# Patient Record
Sex: FEMALE | Race: BLACK OR AFRICAN AMERICAN | Employment: FULL TIME | URBAN - METROPOLITAN AREA
[De-identification: names, ages, dates, MRNs, and addresses within clinical notes are randomized per-mention and may not be internally consistent; named-entity substitution may affect disease eponyms.]

---

## 2017-01-31 ENCOUNTER — OFFICE VISIT (OUTPATIENT)
Dept: FAMILY MEDICINE CLINIC | Age: 47
End: 2017-01-31

## 2017-01-31 VITALS
SYSTOLIC BLOOD PRESSURE: 119 MMHG | DIASTOLIC BLOOD PRESSURE: 78 MMHG | RESPIRATION RATE: 16 BRPM | TEMPERATURE: 98.3 F | WEIGHT: 254 LBS | HEART RATE: 57 BPM | OXYGEN SATURATION: 100 % | BODY MASS INDEX: 40.82 KG/M2 | HEIGHT: 66 IN

## 2017-01-31 DIAGNOSIS — M25.512 ACUTE PAIN OF LEFT SHOULDER: Primary | ICD-10-CM

## 2017-01-31 DIAGNOSIS — R10.13 EPIGASTRIC ABDOMINAL PAIN: ICD-10-CM

## 2017-01-31 DIAGNOSIS — E55.9 VITAMIN D DEFICIENCY: ICD-10-CM

## 2017-01-31 DIAGNOSIS — K27.9 PUD (PEPTIC ULCER DISEASE): ICD-10-CM

## 2017-01-31 DIAGNOSIS — H53.8 BLURRY VISION: ICD-10-CM

## 2017-01-31 DIAGNOSIS — I20.0 UNSTABLE ANGINA PECTORIS (HCC): ICD-10-CM

## 2017-01-31 DIAGNOSIS — J00 ACUTE NASOPHARYNGITIS: ICD-10-CM

## 2017-01-31 DIAGNOSIS — M79.602 LEFT ARM PAIN: ICD-10-CM

## 2017-01-31 DIAGNOSIS — K81.1 CHRONIC CHOLECYSTITIS: ICD-10-CM

## 2017-01-31 DIAGNOSIS — E78.5 HYPERLIPIDEMIA WITH TARGET LDL LESS THAN 70: ICD-10-CM

## 2017-01-31 DIAGNOSIS — H66.003 ACUTE SUPPURATIVE OTITIS MEDIA OF BOTH EARS WITHOUT SPONTANEOUS RUPTURE OF TYMPANIC MEMBRANES, RECURRENCE NOT SPECIFIED: ICD-10-CM

## 2017-01-31 DIAGNOSIS — Z95.5 S/P CORONARY ARTERY STENT PLACEMENT: ICD-10-CM

## 2017-01-31 DIAGNOSIS — I10 ESSENTIAL HYPERTENSION: ICD-10-CM

## 2017-01-31 DIAGNOSIS — Z80.0 FAMILY HISTORY OF PANCREATIC CANCER: ICD-10-CM

## 2017-01-31 DIAGNOSIS — Z78.9 STATIN INTOLERANCE: ICD-10-CM

## 2017-01-31 RX ORDER — SIMETHICONE 180 MG
CAPSULE ORAL
COMMUNITY
End: 2021-01-12 | Stop reason: ALTCHOICE

## 2017-01-31 RX ORDER — SUCRALFATE 1 G/10ML
SUSPENSION ORAL AS NEEDED
COMMUNITY

## 2017-01-31 RX ORDER — PANTOPRAZOLE SODIUM 40 MG/1
40 TABLET, DELAYED RELEASE ORAL DAILY
COMMUNITY
End: 2018-04-03 | Stop reason: ALTCHOICE

## 2017-01-31 RX ORDER — RANITIDINE 150 MG/1
150 TABLET, FILM COATED ORAL 2 TIMES DAILY
COMMUNITY
End: 2020-05-15

## 2017-01-31 NOTE — PATIENT INSTRUCTIONS
Indigestion (Dyspepsia or Heartburn): Care Instructions  Your Care Instructions  Sometimes it can be hard to pinpoint the cause of indigestion (dyspepsia or heartburn). Most cases of an upset stomach with bloating, burning, burping, and nausea are minor and go away within several hours. Home treatment and over-the-counter medicine often are able to control symptoms. But if you take medicine to relieve your indigestion without making diet and lifestyle changes, your symptoms are likely to return again and again. If you get indigestion often, it may be a sign of a more serious medical problem. Be sure to follow up with your doctor, who may want to do tests to be sure of the cause of your indigestion. Follow-up care is a key part of your treatment and safety. Be sure to make and go to all appointments, and call your doctor if you are having problems. Its also a good idea to know your test results and keep a list of the medicines you take. How can you care for yourself at home? · Your doctor may recommend over-the-counter medicine. For mild or occasional indigestion, antacids such as Tums, Gaviscon, Mylanta, or Maalox may help. Your doctor also may recommend over-the-counter acid reducers, such as Pepcid AC, Tagamet HB, Zantac 75, or Prilosec. Read and follow all instructions on the label. If you use these medicines often, talk with your doctor. · Change your eating habits. ¨ Its best to eat several small meals instead of two or three large meals. ¨ After you eat, wait 2 to 3 hours before you lie down. ¨ Chocolate, mint, and alcohol can make GERD worse. ¨ Spicy foods, foods that have a lot of acid (like tomatoes and oranges), and coffee can make GERD symptoms worse in some people. If your symptoms are worse after you eat a certain food, you may want to stop eating that food to see if your symptoms get better. · Do not smoke or chew tobacco. Smoking can make GERD worse.  If you need help quitting, talk to your doctor about stop-smoking programs and medicines. These can increase your chances of quitting for good. · If you have GERD symptoms at night, raise the head of your bed 6 to 8 inches by putting the frame on blocks or placing a foam wedge under the head of your mattress. (Adding extra pillows does not work.)  · Do not wear tight clothing around your middle. · Lose weight if you need to. Losing just 5 to 10 pounds can help. · Do not take anti-inflammatory medicines, such as aspirin, ibuprofen (Advil, Motrin), or naproxen (Aleve). These can irritate the stomach. If you need a pain medicine, try acetaminophen (Tylenol), which does not cause stomach upset. When should you call for help? Call 911 anytime you think you may need emergency care. For example, call if:  · You passed out (lost consciousness). · You vomit blood or what looks like coffee grounds. · You pass maroon or very bloody stools. · You have chest pain or pressure. This may occur with:  ¨ Sweating. ¨ Shortness of breath. ¨ Nausea or vomiting. ¨ Pain that spreads from the chest to the neck, jaw, or one or both shoulders or arms. ¨ Feeling dizzy or lightheaded. ¨ A fast or uneven pulse. After calling 911, chew 1 adult-strength aspirin. Wait for an ambulance. Do not try to drive yourself. Call your doctor now or seek immediate medical care if:  · You have severe belly pain. · Your stools are black and tarlike or have streaks of blood. · You have trouble swallowing. · You are losing weight and do not know why. Watch closely for changes in your health, and be sure to contact your doctor if:  · You do not get better as expected. Where can you learn more? Go to http://daily-germania.info/. Enter K752 in the search box to learn more about \"Indigestion (Dyspepsia or Heartburn): Care Instructions. \"  Current as of: August 9, 2016  Content Version: 11.1  © 6170-4479 [employer], [employer].  Care instructions adapted under license by [address] (which disclaims liability or warranty for this information). If you have questions about a medical condition or this instruction, always ask your healthcare professional. [address] any warranty or liability for your use of this information. HEARTBURN occurs when stomach acid moves back up through your esophagus. Several conditions and foods can contribute to this process. Common causes include:    Hiatal Hernia  Pregnancy  Alcohol use  Overweight or Obesity  Smoking. Consuming certain types of foods or drinks can increase the acid content of the stomach and cause heartburn. AVOID THESE TRIGGERS:     Stress  Alcoholic or carbonated beverages  Caffeine (coffee, tea, chocolate, soda)  Acidic foods or drinks (Oranges or orange juice, apples, apple juice, grapefruit or grapefruit juice, bananas, grape juice)  Tomatoes or tomato based foods (pizza, spaghetti, chili)  Greasy, Fatty or Fried foods  Spicy Foods (including hot sauce)  Garlic  Onions  Mint flavoring (peppermint, speramint, cinnamon). PREVENTIVE MEASURES    Do not wear tight, restrictive clothing. Lose weight. Elevate the head of the bed 4 to 6 inches with blocks or a wedge pillow. Wait 2 to 3 hours after a meal before lying down. Avoid the triggers. MEDICATIONS     Try over the counter medications if needed first.  These include:  TUMS, ROLAIDS, Mylanta, Prilosec 20 mg, Pepcid 20 mg, Tagamet, Zantac up to 150 mg twice daily or 300 mg daily, Nexium 20 mg up to 40 mg daily and Prevacid up to 30 mg daily. Ideally, take for 2 weeks after symptoms consistently appear. NOTIFY OUR OFFICE    If symptoms worsen or persist.   If breathing or swallowing becomes difficult. If you regurgitate blood. DIAL 911 IF THE HEART BURN SYMPTOMS ARE ACCOMPANIED BY   Shortness of breath, sweating, pain in the jaw, neck or arm, cold clammy feeling with nausea or vomiting.   This could be a HEART ATTACK. Indigestion (Dyspepsia or Heartburn): Care Instructions  Your Care Instructions  Sometimes it can be hard to pinpoint the cause of indigestion (dyspepsia or heartburn). Most cases of an upset stomach with bloating, burning, burping, and nausea are minor and go away within several hours. Home treatment and over-the-counter medicine often are able to control symptoms. But if you take medicine to relieve your indigestion without making diet and lifestyle changes, your symptoms are likely to return again and again. If you get indigestion often, it may be a sign of a more serious medical problem. Be sure to follow up with your doctor, who may want to do tests to be sure of the cause of your indigestion. Follow-up care is a key part of your treatment and safety. Be sure to make and go to all appointments, and call your doctor if you are having problems. Its also a good idea to know your test results and keep a list of the medicines you take. How can you care for yourself at home? · Your doctor may recommend over-the-counter medicine. For mild or occasional indigestion, antacids such as Tums, Gaviscon, Mylanta, or Maalox may help. Your doctor also may recommend over-the-counter acid reducers, such as Pepcid AC, Tagamet HB, Zantac 75, or Prilosec. Read and follow all instructions on the label. If you use these medicines often, talk with your doctor. · Change your eating habits. ¨ Its best to eat several small meals instead of two or three large meals. ¨ After you eat, wait 2 to 3 hours before you lie down. ¨ Chocolate, mint, and alcohol can make GERD worse. ¨ Spicy foods, foods that have a lot of acid (like tomatoes and oranges), and coffee can make GERD symptoms worse in some people. If your symptoms are worse after you eat a certain food, you may want to stop eating that food to see if your symptoms get better. · Do not smoke or chew tobacco. Smoking can make GERD worse.  If you need help quitting, talk to your doctor about stop-smoking programs and medicines. These can increase your chances of quitting for good. · If you have GERD symptoms at night, raise the head of your bed 6 to 8 inches by putting the frame on blocks or placing a foam wedge under the head of your mattress. (Adding extra pillows does not work.)  · Do not wear tight clothing around your middle. · Lose weight if you need to. Losing just 5 to 10 pounds can help. · Do not take anti-inflammatory medicines, such as aspirin, ibuprofen (Advil, Motrin), or naproxen (Aleve). These can irritate the stomach. If you need a pain medicine, try acetaminophen (Tylenol), which does not cause stomach upset. When should you call for help? Call 911 anytime you think you may need emergency care. For example, call if:  · You passed out (lost consciousness). · You vomit blood or what looks like coffee grounds. · You pass maroon or very bloody stools. · You have chest pain or pressure. This may occur with:  ¨ Sweating. ¨ Shortness of breath. ¨ Nausea or vomiting. ¨ Pain that spreads from the chest to the neck, jaw, or one or both shoulders or arms. ¨ Feeling dizzy or lightheaded. ¨ A fast or uneven pulse. After calling 911, chew 1 adult-strength aspirin. Wait for an ambulance. Do not try to drive yourself. Call your doctor now or seek immediate medical care if:  · You have severe belly pain. · Your stools are black and tarlike or have streaks of blood. · You have trouble swallowing. · You are losing weight and do not know why. Watch closely for changes in your health, and be sure to contact your doctor if:  · You do not get better as expected. Where can you learn more? Go to http://daily-germania.info/. Enter F023 in the search box to learn more about \"Indigestion (Dyspepsia or Heartburn): Care Instructions. \"  Current as of: August 9, 2016  Content Version: 11.1  © 0410-9866 [employer], [facility].  Care instructions adapted under license by [employer] (which disclaims liability or warranty for this information). If you have questions about a medical condition or this instruction, always ask your healthcare professional. [address] any warranty or liability for your use of this information. Learning About Obesity  What is obesity? Obesity means having so much body fat that your health is in danger. Having too much body fat can lead to type 2 diabetes, heart disease, high blood pressure, arthritis, sleep apnea, and stroke. Even if you don't feel bad now, think about these health risks. Do they seem like a good reason to start on a new path toward a healthier weight? Or do you have another personal, powerful reason for wanting to lose weight? Whatever it is, keep it in mind. It can be hard to change eating habits and exercise habits. But with your own reason and plan, you can do it. How do you know if your weight is in the obesity range? To know if your weight is in the obesity range, your doctor looks at your body mass index (BMI) and waist size. Your BMI is a number that is calculated from your weight and your height. To figure your BMI for yourself, get a BMI table from your doctor or use an online tool, such as http://www.weldon.com/ on the [employer] of [employer]. What causes obesity? When you take in more calories than you burn off, you gain weight. How you eat, how active you are, and other things affect how your body uses calories and whether you gain weight. If you have family members who have too much body fat, you may have inherited a tendency to gain weight. And your family also helps form your eating and lifestyle habits, which can lead to obesity. Also, our busy lives make it harder to plan and cook healthy meals.  For many of us, it's easier to reach for prepared foods, go out to eat, or go to the drive-through. But these foods are often high in saturated fat and calories. Portions are often too large. What can you do to reach a healthy weight? Focus on health, not diets. Diets are hard to stay on and don't work in the long run. It is very hard to stay with a diet that includes lots of big changes in your eating habits. Instead of a diet, focus on lifestyle changes that will improve your health and achieve the right balance of energy and calories. To lose weight, you need to burn more calories than you take in. You can do it by eating healthy foods in reasonable amounts and becoming more active, even a little bit every day. Making small changes over time can add up to a lot. Make a plan for change. Many people have found that naming their reasons for change and staying focused on their plan can make a big difference. Work with your doctor to create a plan that is right for you. · Ask yourself: Pierrelazaro Cantrell are my personal, most powerful reasons for wanting this change? What will my life look like when I've made the change? \"  · Set your long-term goal. Make it specific, such as \"I will lose x pounds. \"  · Break your long-term goal into smaller, short-term goals. Make these small steps specific and within your reach, things you know you can do. These steps are what keep you going from day to day. How can you stay on your plan for change? Be ready. Choose to start during a time when there are few events that might trigger slip-ups, like holidays, social events, and high-stress periods. Decide on your first few steps. Most people have more success when they make small changes, one step at a time. For example, you might switch a daily candy bar to a piece of fruit, walk 10 minutes more, or add more vegetables to a meal.  Line up your support people. Make sure you're not going to be alone as you make this change. Connect with people who understand how important it is to you.  Ask family members and friends for help in keeping with your plan. And think about who could make it harder for you, and how to handle them. Try tracking. People who keep track of what they eat, feel, and do are better at losing weight. Try writing down things like:  · What and how much you eat. · How you feel before and after each meal.  · Details about each meal (like eating out or at home, eating alone, or with friends or family). · What you do to be active. Look and plan. As you track, look for patterns that you may want to change. Take note of:  · When you eat and whether you skip meals. · How often you eat out. · How many fruits and vegetables you eat. · When you eat beyond feeling full. · When and why you eat for reasons other than being hungry. When you stray from your plan, don't get upset. Figure out what made you slip up and how you can fix it. Can you take medicines or have surgery to lose weight? Before your doctor will prescribe medicines or surgery, he or she will probably want you to be more active and follow your healthy eating plan for a period of time. These habits are key lifelong changes for managing your weight, with or without other medical treatment. And these changes can help you avoid weight-related health problems. Follow-up care is a key part of your treatment and safety. Be sure to make and go to all appointments, and call your doctor if you are having problems. It's also a good idea to know your test results and keep a list of the medicines you take. Where can you learn more? Go to http://daily-germania.info/. Enter N111 in the search box to learn more about \"Learning About Obesity. \"  Current as of: February 16, 2016  Content Version: 11.1  © 1922-2311 [employer], [employer]. Care instructions adapted under license by [employer] (which disclaims liability or warranty for this information).  If you have questions about a medical condition or this instruction, always ask your healthcare professional. [place] [address] any warranty or liability for your use of this information. Starting a Weight Loss Plan: Care Instructions  Your Care Instructions  If you are thinking about losing weight, it can be hard to know where to start. Your doctor can help you set up a weight loss plan that best meets your needs. You may want to take a class on nutrition or exercise, or join a weight loss support group. If you have questions about how to make changes to your eating or exercise habits, ask your doctor about seeing a registered dietitian or an exercise specialist.  It can be a big challenge to lose weight. But you do not have to make huge changes at once. Make small changes, and stick with them. When those changes become habit, add a few more changes. If you do not think you are ready to make changes right now, try to pick a date in the future. Make an appointment to see your doctor to discuss whether the time is right for you to start a plan. Follow-up care is a key part of your treatment and safety. Be sure to make and go to all appointments, and call your doctor if you are having problems. Its also a good idea to know your test results and keep a list of the medicines you take. How can you care for yourself at home? · Set realistic goals. Many people expect to lose much more weight than is likely. A weight loss of 5% to 10% of your body weight may be enough to improve your health. · Get family and friends involved to provide support. Talk to them about why you are trying to lose weight, and ask them to help. They can help by participating in exercise and having meals with you, even if they may be eating something different. · Find what works best for you. If you do not have time or do not like to cook, a program that offers meal replacement bars or shakes may be better for you.  Or if you like to prepare meals, finding a plan that includes daily menus and recipes may be best.  · Ask your doctor about other health professionals who can help you achieve your weight loss goals. ¨ A dietitian can help you make healthy changes in your diet. ¨ An exercise specialist or  can help you develop a safe and effective exercise program.  ¨ A counselor or psychiatrist can help you cope with issues such as depression, anxiety, or family problems that can make it hard to focus on weight loss. · Consider joining a support group for people who are trying to lose weight. Your doctor can suggest groups in your area. Where can you learn more? Go to http://dailyIntraxiogermania.info/. Enter H630 in the search box to learn more about \"Starting a Weight Loss Plan: Care Instructions. \"  Current as of: February 16, 2016  Content Version: 11.1  © 4883-7909 [employer]. Care instructions adapted under license by [employer] (which disclaims liability or warranty for this information). If you have questions about a medical condition or this instruction, always ask your healthcare professional. [place] [address] any warranty or liability for your use of this information. EXERCISE 150 MINUTES WEEKLY. THIS CAN BE ACHIEVED BY WORKING OUT OR WALKING A MINIMUM OF 30 MINUTES FOR 5 DAYS WEEKLY. YOU CAN EXERCISE IN INCREMENTS OF 10-15 MINUTES UP TO 3 TIMES A DAY. Consider performing \"brainless exercise. \"  Choose your favorite tv program.  Five minutes before and for 5 minutes after the tv program, stretch your body. While the program is on, walk in place watching the show. When commercials come on, rest or walk around the house to do other things. When the program begins, return to walking in place. When you are able keep walking during the commercials and add light weights to your ankles or hands. By the end of the show, you would have walked 30 minutes.   If you need shorter spurts of exercise, walk during the commercials and rest during the show. Drink to glasses of water prior to any exercise to prevent dehydration and to improve the results of the work out. Your RESTING HEART RATE is the number of times your heart beats per minute when you are not exerting yourself. The more fit you are, the lower your resting heart rate will be. Your MAXIMUM HEART RATE is the number of times per minute your heart pumps when it is working at 100% capacity. NEVER EXERCISE AT YOUR MAXIMUM HEART RATE. MAX HEART RATE = 220 - your age    For example, in a 48year old, the maximum heart rate is 220-50 = 170 beats per minute    Your [place] is the number of beats per minute our heart should pump during aerobic exercise. Reaching your target heart rate indicates that your body is receiving maximum cardiovascular and fat burning benefits. If you are fit, your TARGET HEART RATE = 70-85% of your maximum Heart rate      For a 48year old with vigorous intensity physical activity,         Target heart rate= 170 bpm x .70 = 119 bpm     Target heart rate = 170 bpm x .85=  145 bpm        Therefore, your target heart rate during physical activity is 119-145      If you are not fit, TARGET HEART RATE = 50-70% of your maximum Heart rate    MODERATE CONSISTENT AEROBIC EXERCISE OR WALKING FOR AT LEAST 150 MINUTES WEEKLY IS AN ESSENTIAL PART OF ANY WEIGHT LOSS OF WEIGHT MAINTENANCE PROGRAM.     During WEIGHT LOSS PHASE, at least 75% of your exercise needs to be walking or moderate aerobic activity and 25% or 0% can be Isometric or Resistance type exercises (the latter can be deferred to the weight maintenance phase.)     During WEIGHT MAINTENANCE PHASE, at least 50% of your exercise needs to be aerobic and 50% Isometric or Resistance type exercises. BENEFITS OF MODERATE INTENSITY EXERCISE MOST DAYS OF THE WEEK:     1. Insulin Resistance improves 30-85%   2. Abdominal Fat decreases by 30%   3.   Inflammatory Markers decrease by 30% (therefore pain decreases)   4. Systolic and Diastolic Blood Pressure decrease by 4 mmHg   5. HDL improves by 5%   6. Triglycerides decrease by 15%   7. Shift from small dense LDL to large dense LDL (therefore decrease Insulin Resistance)   8. Decrease coagulability                  Starting a Weight Loss Plan: Care Instructions  Your Care Instructions  If you are thinking about losing weight, it can be hard to know where to start. Your doctor can help you set up a weight loss plan that best meets your needs. You may want to take a class on nutrition or exercise, or join a weight loss support group. If you have questions about how to make changes to your eating or exercise habits, ask your doctor about seeing a registered dietitian or an exercise specialist.  It can be a big challenge to lose weight. But you do not have to make huge changes at once. Make small changes, and stick with them. When those changes become habit, add a few more changes. If you do not think you are ready to make changes right now, try to pick a date in the future. Make an appointment to see your doctor to discuss whether the time is right for you to start a plan. Follow-up care is a key part of your treatment and safety. Be sure to make and go to all appointments, and call your doctor if you are having problems. Its also a good idea to know your test results and keep a list of the medicines you take. How can you care for yourself at home? · Set realistic goals. Many people expect to lose much more weight than is likely. A weight loss of 5% to 10% of your body weight may be enough to improve your health. · Get family and friends involved to provide support. Talk to them about why you are trying to lose weight, and ask them to help. They can help by participating in exercise and having meals with you, even if they may be eating something different. · Find what works best for you.  If you do not have time or do not like to cook, a program that offers meal replacement bars or shakes may be better for you. Or if you like to prepare meals, finding a plan that includes daily menus and recipes may be best.  · Ask your doctor about other health professionals who can help you achieve your weight loss goals. ¨ A dietitian can help you make healthy changes in your diet. ¨ An exercise specialist or  can help you develop a safe and effective exercise program.  ¨ A counselor or psychiatrist can help you cope with issues such as depression, anxiety, or family problems that can make it hard to focus on weight loss. · Consider joining a support group for people who are trying to lose weight. Your doctor can suggest groups in your area. Where can you learn more? Go to http://daily-germania.info/. Enter Q005 in the search box to learn more about \"Starting a Weight Loss Plan: Care Instructions. \"  Current as of: February 16, 2016  Content Version: 11.1  © 1901-6601 [employer], [employer]. Care instructions adapted under license by [employer] (which disclaims liability or warranty for this information). If you have questions about a medical condition or this instruction, always ask your healthcare professional. [place] [id number] any warranty or liability for your use of this [email or web address]

## 2017-01-31 NOTE — PROGRESS NOTES
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 · Do not smoke or chew tobacco. Smoking can make GERD worse. If you need help quitting, talk to your doctor about stop-smoking programs and medicines. These can increase your chances of quitting for good. · If you have GERD symptoms at night, raise the head of your bed 6 to 8 inches by putting the frame on blocks or placing a foam wedge under the head of your mattress. (Adding extra pillows does not work.)  · Do not wear tight clothing around your middle. · Lose weight if you need to. Losing just 5 to 10 pounds can help. · Do not take anti-inflammatory medicines, such as aspirin, ibuprofen (Advil, Motrin), or naproxen (Aleve). These can irritate the stomach. If you need a pain medicine, try acetaminophen (Tylenol), which does not cause stomach upset. When should you call for help? Call 911 anytime you think you may need emergency care. For example, call if:  · You passed out (lost consciousness). · You vomit blood or what looks like coffee grounds. · You pass maroon or very bloody stools.   · You have chest pain or pressure. This may occur with:  ¨ Sweating. ¨ Shortness of breath. ¨ Nausea or vomiting. ¨ Pain that spreads from the chest to the neck, jaw, or one or both shoulders or arms. ¨ Feeling dizzy or lightheaded. ¨ A fast or uneven pulse. After calling 911, chew 1 adult-strength aspirin. Wait for an ambulance. Do not try to drive yourself. Call your doctor now or seek immediate medical care if:  · You have severe belly pain. · Your stools are black and tarlike or have streaks of blood. · You have trouble swallowing. · You are losing weight and do not know why. Watch closely for changes in your health, and be sure to contact your doctor if:  · You do not get better as expected. Where can you learn more? Go to http://daliy-germania.info/. Enter N971 in the search box to learn more about \"Indigestion (Dyspepsia or Heartburn): Care Instructions. \"  Current as of: August 9, 2016  Content Version: 11.1  © 8506-7398 [employer]. Care instructions adapted under license by [employer] (which disclaims liability or warranty for this information). If you have questions about a medical condition or this instruction, always ask your healthcare professional. [address] any warranty or liability for your use of this information. Learning About Obesity  What is obesity? Obesity means having so much body fat that your health is in danger. Having too much body fat can lead to type 2 diabetes, heart disease, high blood pressure, arthritis, sleep apnea, and stroke. Even if you don't feel bad now, think about these health risks. Do they seem like a good reason to start on a new path toward a healthier weight? Or do you have another personal, powerful reason for wanting to lose weight? Whatever it is, keep it in mind. It can be hard to change eating habits and exercise habits. But with your own reason and plan, you can do it.   How do you know if your weight is in the obesity range? To know if your weight is in the obesity range, your doctor looks at your body mass index (BMI) and waist size. Your BMI is a number that is calculated from your weight and your height. To figure your BMI for yourself, get a BMI table from your doctor or use an online tool, such as http://www.AgentPair.com/ on the [employer] of [employer]. What causes obesity? When you take in more calories than you burn off, you gain weight. How you eat, how active you are, and other things affect how your body uses calories and whether you gain weight. If you have family members who have too much body fat, you may have inherited a tendency to gain weight. And your family also helps form your eating and lifestyle habits, which can lead to obesity. Also, our busy lives make it harder to plan and cook healthy meals. For many of us, it's easier to reach for prepared foods, go out to eat, or go to the drive-through. But these foods are often high in saturated fat and calories. Portions are often too large. What can you do to reach a healthy weight? Focus on health, not diets. Diets are hard to stay on and don't work in the long run. It is very hard to stay with a diet that includes lots of big changes in your eating habits. Instead of a diet, focus on lifestyle changes that will improve your health and achieve the right balance of energy and calories. To lose weight, you need to burn more calories than you take in. You can do it by eating healthy foods in reasonable amounts and becoming more active, even a little bit every day. Making small changes over time can add up to a lot. Make a plan for change. Many people have found that naming their reasons for change and staying focused on their plan can make a big difference. Work with your doctor to create a plan that is right for you.   · Ask yourself: Jazz Sanders are my personal, most powerful reasons for wanting this change? What will my life look like when I've made the change? \"  · Set your long-term goal. Make it specific, such as \"I will lose x pounds. \"  · Break your long-term goal into smaller, short-term goals. Make these small steps specific and within your reach, things you know you can do. These steps are what keep you going from day to day. How can you stay on your plan for change? Be ready. Choose to start during a time when there are few events that might trigger slip-ups, like holidays, social events, and high-stress periods. Decide on your first few steps. Most people have more success when they make small changes, one step at a time. For example, you might switch a daily candy bar to a piece of fruit, walk 10 minutes more, or add more vegetables to a meal.  Line up your support people. Make sure you're not going to be alone as you make this change. Connect with people who understand how important it is to you. Ask family members and friends for help in keeping with your plan. And think about who could make it harder for you, and how to handle them. Try tracking. People who keep track of what they eat, feel, and do are better at losing weight. Try writing down things like:  · What and how much you eat. · How you feel before and after each meal.  · Details about each meal (like eating out or at home, eating alone, or with friends or family). · What you do to be active. Look and plan. As you track, look for patterns that you may want to change. Take note of:  · When you eat and whether you skip meals. · How often you eat out. · How many fruits and vegetables you eat. · When you eat beyond feeling full. · When and why you eat for reasons other than being hungry. When you stray from your plan, don't get upset. Figure out what made you slip up and how you can fix it. Can you take medicines or have surgery to lose weight?   Before your doctor will prescribe medicines or surgery, he or she will probably want you to be more active and follow your healthy eating plan for a period of time. These habits are key lifelong changes for managing your weight, with or without other medical treatment. And these changes can help you avoid weight-related health problems. Follow-up care is a key part of your treatment and safety. Be sure to make and go to all appointments, and call your doctor if you are having problems. It's also a good idea to know your test results and keep a list of the medicines you take. Where can you learn more? Go to http://daily-germania.info/. Enter N111 in the search box to learn more about \"Learning About Obesity. \"  Current as of: February 16, 2016  Content Version: 11.1  © 4755-1376 [employer]. Care instructions adapted under license by [employer] (which disclaims liability or warranty for this information). If you have questions about a medical condition or this instruction, always ask your healthcare professional. [place] [address] any warranty or liability for your use of this information. Starting a Weight Loss Plan: Care Instructions  Your Care Instructions  If you are thinking about losing weight, it can be hard to know where to start. Your doctor can help you set up a weight loss plan that best meets your needs. You may want to take a class on nutrition or exercise, or join a weight loss support group. If you have questions about how to make changes to your eating or exercise habits, ask your doctor about seeing a registered dietitian or an exercise specialist.  It can be a big challenge to lose weight. But you do not have to make huge changes at once. Make small changes, and stick with them. When those changes become habit, add a few more changes. If you do not think you are ready to make changes right now, try to pick a date in the future.  Make an appointment to see your doctor to discuss whether the time is right for you to start a plan. Follow-up care is a key part of your treatment and safety. Be sure to make and go to all appointments, and call your doctor if you are having problems. Its also a good idea to know your test results and keep a list of the medicines you take. How can you care for yourself at home? · Set realistic goals. Many people expect to lose much more weight than is likely. A weight loss of 5% to 10% of your body weight may be enough to improve your health. · Get family and friends involved to provide support. Talk to them about why you are trying to lose weight, and ask them to help. They can help by participating in exercise and having meals with you, even if they may be eating something different. · Find what works best for you. If you do not have time or do not like to cook, a program that offers meal replacement bars or shakes may be better for you. Or if you like to prepare meals, finding a plan that includes daily menus and recipes may be best.  · Ask your doctor about other health professionals who can help you achieve your weight loss goals. ¨ A dietitian can help you make healthy changes in your diet. ¨ An exercise specialist or  can help you develop a safe and effective exercise program.  ¨ A counselor or psychiatrist can help you cope with issues such as depression, anxiety, or family problems that can make it hard to focus on weight loss. · Consider joining a support group for people who are trying to lose weight. Your doctor can suggest groups in your area. Where can you learn more? Go to http://daily-germania.info/. Enter C787 in the search box to learn more about \"Starting a Weight Loss Plan: Care Instructions. \"  Current as of: February 16, 2016  Content Version: 11.1  © 9068-6946 [employer].  Care instructions adapted under license by [employer] (which disclaims liability or warranty for this information). If you have questions about a medical condition or this instruction, always ask your healthcare professional. [address] any warranty or liability for your use of this information. EXERCISE 150 MINUTES WEEKLY. THIS CAN BE ACHIEVED BY WORKING OUT OR WALKING A MINIMUM OF 30 MINUTES FOR 5 DAYS WEEKLY. YOU CAN EXERCISE IN INCREMENTS OF 10-15 MINUTES UP TO 3 TIMES A DAY. Consider performing \"brainless exercise. \"  Choose your favorite tv program.  Five minutes before and for 5 minutes after the tv program, stretch your body. While the program is on, walk in place watching the show. When commercials come on, rest or walk around the house to do other things. When the program begins, return to walking in place. When you are able keep walking during the commercials and add light weights to your ankles or hands. By the end of the show, you would have walked 30 minutes. If you need shorter spurts of exercise, walk during the commercials and rest during the show. Drink to glasses of water prior to any exercise to prevent dehydration and to improve the results of the work out. Your RESTING HEART RATE is the number of times your heart beats per minute when you are not exerting yourself. The more fit you are, the lower your resting heart rate will be. Your MAXIMUM HEART RATE is the number of times per minute your heart pumps when it is working at 100% capacity. NEVER EXERCISE AT YOUR MAXIMUM HEART RATE. MAX HEART RATE = 220 - your age    For example, in a 48year old, the maximum heart rate is 220-50 = 170 beats per minute    Your [place] is the number of beats per minute our heart should pump during aerobic exercise. Reaching your target heart rate indicates that your body is receiving maximum cardiovascular and fat burning benefits.      If you are fit, your TARGET HEART RATE = 70-85% of your maximum Heart rate      For a 48year old with vigorous intensity physical activity,         Target heart rate= 170 bpm x .70 = 119 bpm     Target heart rate = 170 bpm x .85=  145 bpm        Therefore, your target heart rate during physical activity is 119-145      If you are not fit, TARGET HEART RATE = 50-70% of your maximum Heart rate    MODERATE CONSISTENT AEROBIC EXERCISE OR WALKING FOR AT LEAST 150 MINUTES WEEKLY IS AN ESSENTIAL PART OF ANY WEIGHT LOSS OF WEIGHT MAINTENANCE PROGRAM.     During WEIGHT LOSS PHASE, at least 75% of your exercise needs to be walking or moderate aerobic activity and 25% or 0% can be Isometric or Resistance type exercises (the latter can be deferred to the weight maintenance phase.)     During WEIGHT MAINTENANCE PHASE, at least 50% of your exercise needs to be aerobic and 50% Isometric or Resistance type exercises. BENEFITS OF MODERATE INTENSITY EXERCISE MOST DAYS OF THE WEEK:     1. Insulin Resistance improves 30-85%   2. Abdominal Fat decreases by 30%   3. Inflammatory Markers decrease by 30% (therefore pain decreases)   4. Systolic and Diastolic Blood Pressure decrease by 4 mmHg   5. HDL improves by 5%   6. Triglycerides decrease by 15%   7. Shift from small dense LDL to large dense LDL (therefore decrease Insulin Resistance)   8. Decrease coagulability                  Starting a Weight Loss Plan: Care Instructions  Your Care Instructions  If you are thinking about losing weight, it can be hard to know where to start. Your doctor can help you set up a weight loss plan that best meets your needs. You may want to take a class on nutrition or exercise, or join a weight loss support group. If you have questions about how to make changes to your eating or exercise habits, ask your doctor about seeing a registered dietitian or an exercise specialist.  It can be a big challenge to lose weight. But you do not have to make huge changes at once. Make small changes, and stick with them.  When those changes become habit, add a few more changes. If you do not think you are ready to make changes right now, try to pick a date in the future. Make an appointment to see your doctor to discuss whether the time is right for you to start a plan. Follow-up care is a key part of your treatment and safety. Be sure to make and go to all appointments, and call your doctor if you are having problems. Its also a good idea to know your test results and keep a list of the medicines you take. How can you care for yourself at home? · Set realistic goals. Many people expect to lose much more weight than is likely. A weight loss of 5% to 10% of your body weight may be enough to improve your health. · Get family and friends involved to provide support. Talk to them about why you are trying to lose weight, and ask them to help. They can help by participating in exercise and having meals with you, even if they may be eating something different. · Find what works best for you. If you do not have time or do not like to cook, a program that offers meal replacement bars or shakes may be better for you. Or if you like to prepare meals, finding a plan that includes daily menus and recipes may be best.  · Ask your doctor about other health professionals who can help you achieve your weight loss goals. ¨ A dietitian can help you make healthy changes in your diet. ¨ An exercise specialist or  can help you develop a safe and effective exercise program.  ¨ A counselor or psychiatrist can help you cope with issues such as depression, anxiety, or family problems that can make it hard to focus on weight loss. · Consider joining a support group for people who are trying to lose weight. Your doctor can suggest groups in your area. Where can you learn more? Go to http://daily-germania.info/. Enter L374 in the search box to learn more about \"Starting a Weight Loss Plan: Care Instructions. \"  Current as of: February 16, 2016  Content Version: 11.1  © 4361-0143 [employer]. Care instructions adapted under license by [employer] (which disclaims liability or warranty for this information). If you have questions about a medical condition or this instruction, always ask your healthcare professional. [address] any warranty or liability for your use of this information.

## 2017-01-31 NOTE — MR AVS SNAPSHOT
Visit Information Date & Time Provider Department Dept. Phone Encounter #  
 1/31/2017  3:00 PM Gaston Bunn DO [facility] [phone] [id number] Follow-up Instructions Return in about 4 months (around 5/31/2017) for referral follow up. Your Appointments 2/15/2017  3:45 PM  
6 MONTH with Ana Rogers MD  
[facility]) Appt Note: . [address]  
[phone] [address]  
  
    
 4/26/2017  9:15 AM  
COMPLETE PHYSICAL with Gaston Bunn DO [facility] ([facility] [address]) Appt Note: Cpe  
 [address] 
[address] [facility] [place] [address]  
[phone]  
  
   
 [address] [facility] [address] [facility] [address] [facility] Upcoming Health Maintenance Date Due  
 PAP AKA CERVICAL CYTOLOGY 6/16/2017* DTaP/Tdap/Td series (2 - Td) 1/31/2027 *Topic was postponed. The date shown is not the original due date. Allergies as of 1/31/2017  Review Complete On: 1/31/2017 By: Trell Nelson LPN Severity Noted Reaction Type Reactions Amitiza [Lubiprostone]  10/23/2009    Nausea Only Lipitor [Atorvastatin]  01/08/2014    Myalgia Memory disturbance Current Immunizations  Reviewed on 1/31/2017 Name Date Td 4/1/2013 Reviewed by Gaston Bunn DO on 1/31/2017 at  4:11 PM  
You Were Diagnosed With   
  
 Codes Comments Acute pain of left shoulder    -  Primary ICD-10-CM: M25.512 ICD-9-CM: 719.41 recurrent with certain movement x 6 months Essential hypertension     ICD-10-CM: I10 
ICD-9-CM: 401.9 Hyperlipidemia with target LDL less than 70     ICD-10-CM: E78.5 ICD-9-CM: 272.4 Epigastric abdominal pain     ICD-10-CM: R10.13 ICD-9-CM: 789.06 Unstable angina pectoris ([facility] [address]     ICD-10-CM: I20.0 ICD-9-CM: 411.1 Acute suppurative otitis media of both ears without spontaneous rupture of tympanic membranes, recurrence not specified     ICD-10-CM: H66.003 ICD-9-CM: 382.00 resolved after Rosalynd Love Acute nasopharyngitis     ICD-10-CM: Florian Marti ICD-9-CM: 798 resolved after Rosalynd Love Statin intolerance     ICD-10-CM: Z78.9 ICD-9-CM: 995.27 Left arm pain     ICD-10-CM: M79.602 ICD-9-CM: 729.5 from L shoulder off and on due to musculoskeletal vs statin vs other Vitamin D deficiency     ICD-10-CM: E55.9 ICD-9-CM: 268.9 Family history of pancreatic cancer     ICD-10-CM: Z80.0 ICD-9-CM: V16.0 PUD (peptic ulcer disease)     ICD-10-CM: K27.9 ICD-9-CM: 533.90 Chronic cholecystitis     ICD-10-CM: K81.1 ICD-9-CM: 575.11 resolved after removed S/P coronary artery stent placement     ICD-10-CM: Z95.5 ICD-9-CM: V45.82 Blurry vision     ICD-10-CM: H53.8 ICD-9-CM: 368.8 with reading Vitals BP Pulse Temp Resp Height(growth percentile) Weight(growth percentile) 119/78 (BP 1 Location: Left arm, BP Patient Position: Sitting) (!) 57 98.3 °F (36.8 °C) (Oral) 16 5' 6\" (1.676 m) 254 lb (115.2 kg) LMP SpO2 BMI OB Status Smoking Status 01/31/2017 100% 41 kg/m2 Having regular periods Never Smoker Vitals History BMI and BSA Data Body Mass Index Body Surface Area 41 kg/m 2 2.32 m 2 Preferred Pharmacy Pharmacy Name Phone [id number] [address] [phone] Your Updated Medication List  
  
   
This list is accurate as of: 1/31/17  4:15 PM.  Always use your most recent med list.  
  
  
  
  
 aspirin delayed-release 81 mg tablet Take 1 Tab by mouth daily. CRESTOR 20 mg tablet Generic drug:  rosuvastatin TAKE 1 TABLET BY MOUTH AT BEDTIME  
  
 fluticasone 50 mcg/actuation nasal spray Commonly known as:  Shelvia Birks INSTILL 1 SPRAY IN EACH NOSTRIL TWICE DAILY LINZESS 290 mcg Cap capsule Generic drug:  linaclotide  
as needed. D. [place]  
  
 metoprolol tartrate 25 mg tablet Commonly known as:  LOPRESSOR  
TAKE 1/2 TABLET TWICE A DAY  
  
 omega-3 fatty acids-vitamin e 1,000 mg Cap Commonly known as:  FISH OIL Take 1 Cap by mouth daily. Take 1 tablet daily x 2 weeks. Increase to 2 tablets daily PHAZYME 180 mg Cap Generic drug:  simethicone Take  by mouth. PROTONIX 40 mg tablet Generic drug:  pantoprazole Take 40 mg by mouth daily. sucralfate 100 mg/mL suspension Commonly known as:  Richad Mighty Take  by mouth four (4) times daily. ZANTAC 150 mg tablet Generic drug:  raNITIdine Take 150 mg by mouth two (2) times a day. We Performed the Following AMYLASE Y4050467 CPT(R)] DENISE, DIRECT, W/REFLEX [id number] CPT(R)] CBC W/O DIFF [81964 CPT(R)] LIPASE [id number] CPT(R)] LIPID PANEL [33736 CPT(R)] MICROALBUMIN, UR, RAND W/ MICROALBUMIN/CREA RATIO [id number] CPT(R)] NMR LIPOPROFILE [id number] CPT(R)] REFERRAL TO OPHTHALMOLOGY [REF57 Custom] Comments:  
 Please evaluate patient for blurry vision, hypertension. REFERRAL TO ORTHOPEDIC SURGERY [REF62 Custom] Comments:  
 Please evaluate patient for recurrent L shoulder pain radiating into L arm. SED RATE (ESR) [id number] CPT(R)] TSH 3RD GENERATION [65999 CPT(R)] URINALYSIS W/ RFLX MICROSCOPIC [80987 CPT(R)] VITAMIN D, 25 HYDROXY [id number] CPT(R)] Follow-up Instructions Return in about 4 months (around 5/31/2017) for referral follow up. Referral Information Referral ID Referred By Referred To  
  
 [id number] Shaheed Liang [address] [facility]   
   [address], [address] Phone: [phone] Fax: [phone] Visits Status Start Date End Date 1 New Request 1/31/17 1/31/18  If your referral has a status of pending review or denied, additional information will be sent to support the outcome of this decision. Referral ID Referred By Referred To  
 [id number] Enoch Douglass [facility] [address] [place], [address] Visits Status Start Date End Date 1 New Request 1/31/17 1/31/18 If your referral has a status of pending review or denied, additional information will be sent to support the outcome of this decision. Patient Instructions Indigestion (Dyspepsia or Heartburn): Care Instructions Your Care Instructions Sometimes it can be hard to pinpoint the cause of indigestion (dyspepsia or heartburn). Most cases of an upset stomach with bloating, burning, burping, and nausea are minor and go away within several hours. Home treatment and over-the-counter medicine often are able to control symptoms. But if you take medicine to relieve your indigestion without making diet and lifestyle changes, your symptoms are likely to return again and again. If you get indigestion often, it may be a sign of a more serious medical problem. Be sure to follow up with your doctor, who may want to do tests to be sure of the cause of your indigestion. Follow-up care is a key part of your treatment and safety. Be sure to make and go to all appointments, and call your doctor if you are having problems. Its also a good idea to know your test results and keep a list of the medicines you take. How can you care for yourself at home? · Your doctor may recommend over-the-counter medicine. For mild or occasional indigestion, antacids such as Tums, Gaviscon, Mylanta, or Maalox may help. Your doctor also may recommend over-the-counter acid reducers, such as Pepcid AC, Tagamet HB, Zantac 75, or Prilosec. Read and follow all instructions on the label. If you use these medicines often, talk with your doctor. · Change your eating habits. ¨ Its best to eat several small meals instead of two or three large meals. ¨ After you eat, wait 2 to 3 hours before you lie down. ¨ Chocolate, mint, and alcohol can make GERD worse. ¨ Spicy foods, foods that have a lot of acid (like tomatoes and oranges), and coffee can make GERD symptoms worse in some people. If your symptoms are worse after you eat a certain food, you may want to stop eating that food to see if your symptoms get better. · Do not smoke or chew tobacco. Smoking can make GERD worse. If you need help quitting, talk to your doctor about stop-smoking programs and medicines. These can increase your chances of quitting for good. · If you have GERD symptoms at night, raise the head of your bed 6 to 8 inches by putting the frame on blocks or placing a foam wedge under the head of your mattress. (Adding extra pillows does not work.) · Do not wear tight clothing around your middle. · Lose weight if you need to. Losing just 5 to 10 pounds can help. · Do not take anti-inflammatory medicines, such as aspirin, ibuprofen (Advil, Motrin), or naproxen (Aleve). These can irritate the stomach. If you need a pain medicine, try acetaminophen (Tylenol), which does not cause stomach upset. When should you call for help? Call 911 anytime you think you may need emergency care. For example, call if: 
· You passed out (lost consciousness). · You vomit blood or what looks like coffee grounds. · You pass maroon or very bloody stools. · You have chest pain or pressure. This may occur with: ¨ Sweating. ¨ Shortness of breath. ¨ Nausea or vomiting. ¨ Pain that spreads from the chest to the neck, jaw, or one or both shoulders or arms. ¨ Feeling dizzy or lightheaded. ¨ A fast or uneven pulse. After calling 911, chew 1 adult-strength aspirin. Wait for an ambulance. Do not try to drive yourself. Call your doctor now or seek immediate medical care if: 
· You have severe belly pain. · Your stools are black and tarlike or have streaks of blood. · You have trouble swallowing. · You are losing weight and do not know why. Watch closely for changes in your health, and be sure to contact your doctor if: 
· You do not get better as expected. Where can you learn more? Go to http://daily-germania.info/. Enter Z672 in the search box to learn more about \"Indigestion (Dyspepsia or Heartburn): Care Instructions. \" Current as of: August 9, 2016 Content Version: 11.1 © 7237-9959 Curbed.[employer]. Care instructions adapted under license by [employer] (which disclaims liability or warranty for this information). If you have questions about a medical condition or this instruction, always ask your healthcare professional. [place] [address] any warranty or liability for your use of this information. HEARTBURN occurs when stomach acid moves back up through your esophagus. Several conditions and foods can contribute to this process. Common causes include: 
 
Hiatal Hernia Pregnancy Alcohol use Overweight or Obesity Smoking. Consuming certain types of foods or drinks can increase the acid content of the stomach and cause heartburn. AVOID THESE TRIGGERS:  
 
Stress Alcoholic or carbonated beverages Caffeine (coffee, tea, chocolate, soda) Acidic foods or drinks (Oranges or orange juice, apples, apple juice, grapefruit or grapefruit juice, bananas, grape juice) Tomatoes or tomato based foods (pizza, spaghetti, chili) Greasy, Fatty or Fried foods Spicy Foods (including hot sauce) Garlic Onions Mint flavoring (peppermint, speramint, cinnamon). PREVENTIVE MEASURES Do not wear tight, restrictive clothing. Lose weight. Elevate the head of the bed 4 to 6 inches with blocks or a wedge pillow. Wait 2 to 3 hours after a meal before lying down. Avoid the triggers. MEDICATIONS Try over the counter medications if needed first.  These include: 
TUMS, ROLAIDS, Mylanta, Prilosec 20 mg, Pepcid 20 mg, Tagamet, Zantac up to 150 mg twice daily or 300 mg daily, Nexium 20 mg up to 40 mg daily and Prevacid up to 30 mg daily. Ideally, take for 2 weeks after symptoms consistently appear. NOTIFY OUR OFFICE If symptoms worsen or persist.  
If breathing or swallowing becomes difficult. If you regurgitate blood. DIAL 911 IF THE HEART BURN SYMPTOMS ARE ACCOMPANIED BY Shortness of breath, sweating, pain in the jaw, neck or arm, cold clammy feeling with nausea or vomiting. This could be a HEART ATTACK. Indigestion (Dyspepsia or Heartburn): Care Instructions Your Care Instructions Sometimes it can be hard to pinpoint the cause of indigestion (dyspepsia or heartburn). Most cases of an upset stomach with bloating, burning, burping, and nausea are minor and go away within several hours. Home treatment and over-the-counter medicine often are able to control symptoms. But if you take medicine to relieve your indigestion without making diet and lifestyle changes, your symptoms are likely to return again and again. If you get indigestion often, it may be a sign of a more serious medical problem. Be sure to follow up with your doctor, who may want to do tests to be sure of the cause of your indigestion. Follow-up care is a key part of your treatment and safety. Be sure to make and go to all appointments, and call your doctor if you are having problems. Its also a good idea to know your test results and keep a list of the medicines you take. How can you care for yourself at home? · Your doctor may recommend over-the-counter medicine. For mild or occasional indigestion, antacids such as Tums, Gaviscon, Mylanta, or Maalox may help. Your doctor also may recommend over-the-counter acid reducers, such as Pepcid AC, Tagamet HB, Zantac 75, or Prilosec. Read and follow all instructions on the label. If you use these medicines often, talk with your doctor. · Change your eating habits. ¨ Its best to eat several small meals instead of two or three large meals. ¨ After you eat, wait 2 to 3 hours before you lie down. ¨ Chocolate, mint, and alcohol can make GERD worse. ¨ Spicy foods, foods that have a lot of acid (like tomatoes and oranges), and coffee can make GERD symptoms worse in some people. If your symptoms are worse after you eat a certain food, you may want to stop eating that food to see if your symptoms get better. · Do not smoke or chew tobacco. Smoking can make GERD worse. If you need help quitting, talk to your doctor about stop-smoking programs and medicines. These can increase your chances of quitting for good. · If you have GERD symptoms at night, raise the head of your bed 6 to 8 inches by putting the frame on blocks or placing a foam wedge under the head of your mattress. (Adding extra pillows does not work.) · Do not wear tight clothing around your middle. · Lose weight if you need to. Losing just 5 to 10 pounds can help. · Do not take anti-inflammatory medicines, such as aspirin, ibuprofen (Advil, Motrin), or naproxen (Aleve). These can irritate the stomach. If you need a pain medicine, try acetaminophen (Tylenol), which does not cause stomach upset. When should you call for help? Call 911 anytime you think you may need emergency care. For example, call if: 
· You passed out (lost consciousness). · You vomit blood or what looks like coffee grounds. · You pass maroon or very bloody stools. · You have chest pain or pressure. This may occur with: ¨ Sweating. ¨ Shortness of breath. ¨ Nausea or vomiting. ¨ Pain that spreads from the chest to the neck, jaw, or one or both shoulders or arms. ¨ Feeling dizzy or lightheaded. ¨ A fast or uneven pulse. After calling 911, chew 1 adult-strength aspirin. Wait for an ambulance. Do not try to drive yourself. Call your doctor now or seek immediate medical care if: 
· You have severe belly pain. · Your stools are black and tarlike or have streaks of blood. · You have trouble swallowing. · You are losing weight and do not know why. Watch closely for changes in your health, and be sure to contact your doctor if: 
· You do not get better as expected. Where can you learn more? Go to http://daily-germania.info/. Enter H516 in the search box to learn more about \"Indigestion (Dyspepsia or Heartburn): Care Instructions. \" Current as of: August 9, 2016 Content Version: 11.1 © 7015-3382 [employer] Butter[employer]. Care instructions adapted under license by [employer] (which disclaims liability or warranty for this information). If you have questions about a medical condition or this instruction, always ask your healthcare professional. [address] any warranty or liability for your use of this information. Learning About Obesity What is obesity? Obesity means having so much body fat that your health is in danger. Having too much body fat can lead to type 2 diabetes, heart disease, high blood pressure, arthritis, sleep apnea, and stroke. Even if you don't feel bad now, think about these health risks. Do they seem like a good reason to start on a new path toward a healthier weight? Or do you have another personal, powerful reason for wanting to lose weight? Whatever it is, keep it in mind. It can be hard to change eating habits and exercise habits. But with your own reason and plan, you can do it. How do you know if your weight is in the obesity range? To know if your weight is in the obesity range, your doctor looks at your body mass index (BMI) and waist size. Your BMI is a number that is calculated from your weight and your height. To figure your BMI for yourself, get a BMI table from your doctor or use an online tool, such as http://www.weldon.com/ on the [employer]. What causes obesity? When you take in more calories than you burn off, you gain weight. How you eat, how active you are, and other things affect how your body uses calories and whether you gain weight. If you have family members who have too much body fat, you may have inherited a tendency to gain weight. And your family also helps form your eating and lifestyle habits, which can lead to obesity. Also, our busy lives make it harder to plan and cook healthy meals. For many of us, it's easier to reach for prepared foods, go out to eat, or go to the drive-through. But these foods are often high in saturated fat and calories. Portions are often too large. What can you do to reach a healthy weight? Focus on health, not diets. Diets are hard to stay on and don't work in the long run. It is very hard to stay with a diet that includes lots of big changes in your eating habits. Instead of a diet, focus on lifestyle changes that will improve your health and achieve the right balance of energy and calories. To lose weight, you need to burn more calories than you take in. You can do it by eating healthy foods in reasonable amounts and becoming more active, even a little bit every day. Making small changes over time can add up to a lot. Make a plan for change. Many people have found that naming their reasons for change and staying focused on their plan can make a big difference. Work with your doctor to create a plan that is right for you. · Ask yourself: Jasmin Corinne are my personal, most powerful reasons for wanting this change? What will my life look like when I've made the change? \" · Set your long-term goal. Make it specific, such as \"I will lose x pounds. \" 
· Break your long-term goal into smaller, short-term goals. Make these small steps specific and within your reach, things you know you can do. These steps are what keep you going from day to day. How can you stay on your plan for change? Be ready. Choose to start during a time when there are few events that might trigger slip-ups, like holidays, social events, and high-stress periods. Decide on your first few steps. Most people have more success when they make small changes, one step at a time. For example, you might switch a daily candy bar to a piece of fruit, walk 10 minutes more, or add more vegetables to a meal. 
Line up your support people. Make sure you're not going to be alone as you make this change. Connect with people who understand how important it is to you. Ask family members and friends for help in keeping with your plan. And think about who could make it harder for you, and how to handle them. Try tracking. People who keep track of what they eat, feel, and do are better at losing weight. Try writing down things like: · What and how much you eat. · How you feel before and after each meal. 
· Details about each meal (like eating out or at home, eating alone, or with friends or family). · What you do to be active. Look and plan. As you track, look for patterns that you may want to change. Take note of: · When you eat and whether you skip meals. · How often you eat out. · How many fruits and vegetables you eat. · When you eat beyond feeling full. · When and why you eat for reasons other than being hungry. When you stray from your plan, don't get upset. Figure out what made you slip up and how you can fix it. Can you take medicines or have surgery to lose weight? Before your doctor will prescribe medicines or surgery, he or she will probably want you to be more active and follow your healthy eating plan for a period of time. These habits are key lifelong changes for managing your weight, with or without other medical treatment. And these changes can help you avoid weight-related health problems. Follow-up care is a key part of your treatment and safety.  Be sure to make and go to all appointments, and call your doctor if you are having problems. It's also a good idea to know your test results and keep a list of the medicines you take. Where can you learn more? Go to http://daily-germania.info/. Enter N111 in the search box to learn more about \"Learning About Obesity. \" Current as of: February 16, 2016 Content Version: 11.1 © 9312-6267 [employer]. Care instructions adapted under license by [employer] (which disclaims liability or warranty for this information). If you have questions about a medical condition or this instruction, always ask your healthcare professional. [address] any warranty or liability for your use of this information. Starting a Weight Loss Plan: Care Instructions Your Care Instructions If you are thinking about losing weight, it can be hard to know where to start. Your doctor can help you set up a weight loss plan that best meets your needs. You may want to take a class on nutrition or exercise, or join a weight loss support group. If you have questions about how to make changes to your eating or exercise habits, ask your doctor about seeing a registered dietitian or an exercise specialist. 
It can be a big challenge to lose weight. But you do not have to make huge changes at once. Make small changes, and stick with them. When those changes become habit, add a few more changes. If you do not think you are ready to make changes right now, try to pick a date in the future. Make an appointment to see your doctor to discuss whether the time is right for you to start a plan. Follow-up care is a key part of your treatment and safety. Be sure to make and go to all appointments, and call your doctor if you are having problems. Its also a good idea to know your test results and keep a list of the medicines you take. How can you care for yourself at home? · Set realistic goals. Many people expect to lose much more weight than is likely. A weight loss of 5% to 10% of your body weight may be enough to improve your health. · Get family and friends involved to provide support. Talk to them about why you are trying to lose weight, and ask them to help. They can help by participating in exercise and having meals with you, even if they may be eating something different. · Find what works best for you. If you do not have time or do not like to cook, a program that offers meal replacement bars or shakes may be better for you. Or if you like to prepare meals, finding a plan that includes daily menus and recipes may be best. 
· Ask your doctor about other health professionals who can help you achieve your weight loss goals. ¨ A dietitian can help you make healthy changes in your diet. ¨ An exercise specialist or  can help you develop a safe and effective exercise program. 
¨ A counselor or psychiatrist can help you cope with issues such as depression, anxiety, or family problems that can make it hard to focus on weight loss. · Consider joining a support group for people who are trying to lose weight. Your doctor can suggest groups in your area. Where can you learn more? Go to http://dailyOnTheGo Platformsgermania.info/. Enter F279 in the search box to learn more about \"Starting a Weight Loss Plan: Care Instructions. \" Current as of: February 16, 2016 Content Version: 11.1 © 7777-7126 [employer]wise[employer]. Care instructions adapted under license by [employer]Share (which disclaims liability or warranty for this information). If you have questions about a medical condition or this instruction, always ask your healthcare professional. [address] 41 any warranty or liability for your use of this information. EXERCISE 150 MINUTES WEEKLY.   THIS CAN BE ACHIEVED BY WORKING OUT OR WALKING A MINIMUM OF 30 MINUTES FOR 5 DAYS WEEKLY. YOU CAN EXERCISE IN INCREMENTS OF 10-15 MINUTES UP TO 3 TIMES A DAY. Consider performing \"brainless exercise. \"  Choose your favorite tv program.  Five minutes before and for 5 minutes after the tv program, stretch your body. While the program is on, walk in place watching the show. When commercials come on, rest or walk around the house to do other things. When the program begins, return to walking in place. When you are able keep walking during the commercials and add light weights to your ankles or hands. By the end of the show, you would have walked 30 minutes. If you need shorter spurts of exercise, walk during the commercials and rest during the show. Drink to glasses of water prior to any exercise to prevent dehydration and to improve the results of the work out. Your RESTING HEART RATE is the number of times your heart beats per minute when you are not exerting yourself. The more fit you are, the lower your resting heart rate will be. Your MAXIMUM HEART RATE is the number of times per minute your heart pumps when it is working at 100% capacity. NEVER EXERCISE AT YOUR MAXIMUM HEART RATE. MAX HEART RATE = 220 - your age For example, in a 48year old, the maximum heart rate is 220-50 = 170 beats per minute Your TARGET HEART RATE is the number of beats per minute our heart should pump during aerobic exercise. Reaching your target heart rate indicates that your body is receiving maximum cardiovascular and fat burning benefits. If you are fit, your TARGET HEART RATE = 70-85% of your maximum Heart rate For a 48year old with vigorous intensity physical activity, Target heart rate= 170 bpm x .70 = 119 bpm 
   Target heart rate = 170 bpm x .85=  145 bpm 
 
    Therefore, your target heart rate during physical activity is 119-145 If you are not fit, TARGET HEART RATE = 50-70% of your maximum Heart rate MODERATE CONSISTENT AEROBIC EXERCISE OR WALKING FOR AT LEAST 150 MINUTES WEEKLY IS AN ESSENTIAL PART OF ANY WEIGHT LOSS OF WEIGHT MAINTENANCE PROGRAM. During WEIGHT LOSS PHASE, at least 75% of your exercise needs to be walking or moderate aerobic activity and 25% or 0% can be Isometric or Resistance type exercises (the latter can be deferred to the weight maintenance phase.) During WEIGHT MAINTENANCE PHASE, at least 50% of your exercise needs to be aerobic and 50% Isometric or Resistance type exercises. BENEFITS OF MODERATE INTENSITY EXERCISE MOST DAYS OF THE WEEK: 
 
 1. Insulin Resistance improves 30-85% 2. Abdominal Fat decreases by 30% 3. Inflammatory Markers decrease by 30% (therefore pain decreases) 4. Systolic and Diastolic Blood Pressure decrease by 4 mmHg 5. HDL improves by 5% 6. Triglycerides decrease by 15% 7. Shift from small dense LDL to large dense LDL (therefore decrease Insulin Resistance) 8. Decrease coagulability Starting a Weight Loss Plan: Care Instructions Your Care Instructions If you are thinking about losing weight, it can be hard to know where to start. Your doctor can help you set up a weight loss plan that best meets your needs. You may want to take a class on nutrition or exercise, or join a weight loss support group. If you have questions about how to make changes to your eating or exercise habits, ask your doctor about seeing a registered dietitian or an exercise specialist. 
It can be a big challenge to lose weight. But you do not have to make huge changes at once. Make small changes, and stick with them. When those changes become habit, add a few more changes. If you do not think you are ready to make changes right now, try to pick a date in the future. Make an appointment to see your doctor to discuss whether the time is right for you to start a plan. Follow-up care is a key part of your treatment and safety.  Be sure to make and go to all appointments, and call your doctor if you are having problems. Its also a good idea to know your test results and keep a list of the medicines you take. How can you care for yourself at home? · Set realistic goals. Many people expect to lose much more weight than is likely. A weight loss of 5% to 10% of your body weight may be enough to improve your health. · Get family and friends involved to provide support. Talk to them about why you are trying to lose weight, and ask them to help. They can help by participating in exercise and having meals with you, even if they may be eating something different. · Find what works best for you. If you do not have time or do not like to cook, a program that offers meal replacement bars or shakes may be better for you. Or if you like to prepare meals, finding a plan that includes daily menus and recipes may be best. 
· Ask your doctor about other health professionals who can help you achieve your weight loss goals. ¨ A dietitian can help you make healthy changes in your diet. ¨ An exercise specialist or  can help you develop a safe and effective exercise program. 
¨ A counselor or psychiatrist can help you cope with issues such as depression, anxiety, or family problems that can make it hard to focus on weight loss. · Consider joining a support group for people who are trying to lose weight. Your doctor can suggest groups in your area. Where can you learn more? Go to http://daily-germania.info/. Enter P400 in the search box to learn more about \"Starting a Weight Loss Plan: Care Instructions. \" Current as of: February 16, 2016 Content Version: 11.1 © 9476-0358 Healthwise, [employer]orporated. Care instructions adapted under license by [employer] (which disclaims liability or warranty for this information).  If you have questions about a medical condition or this instruction, always ask your healthcare professional. [address] any warranty or liability for your use of this information. Introducing [facility]! Dear Cadence Joe: Thank you for requesting a [employer] account. Our records indicate that you already have an active [employer] account. You can access your account anytime at [email or web address] Did you know that you can access your hospital and ER discharge instructions at any time in [employer]? You can also review all of your test results from your hospital stay or ER visit. Additional Information If you have questions, please visit the Frequently Asked Questions section of the [employer] website at [email or web address] Remember, [employer] is NOT to be used for urgent needs. For medical emergencies, dial 911. Now available from your iPhone and Android! Please provide this summary of care documentation to your next provider. Your primary care clinician is listed as Isma Basilio. If you have any questions after today's visit, please call [phone].

## 2017-01-31 NOTE — PROGRESS NOTES
Chief Complaint   Patient presents with    Shoulder Pain     Left Shoulder Pain         1. Have you been to the ER, urgent care clinic since your last visit? Hospitalized since your last visit? Yes When: 8/26/16 [facility] [place] [facility]- [facility]- January 2017, DX: Inner Ear Infection, Sinus Infection    2. Have you seen or consulted any other health care providers outside of the [address] [place] [address] since your last visit? Include any pap smears or colon screening. Yes- Dr Olivier Randall- July 2016 for annual visit- office note requested at [phone]. Records requested from Dr. Laura Couch office at 1973. Patient does not have advanced directives- pamphlet given.

## 2017-01-31 NOTE — ACP (ADVANCE CARE PLANNING)
Discussed ACP with patient. Gave pt an Right to [facility]. Patient prefers to read it on her own. Declines referral to [facility] team at this time. Patient will bring document to her next office visit or attach it to her MyChart record.

## 2017-02-13 RX ORDER — METOPROLOL TARTRATE 25 MG/1
TABLET, FILM COATED ORAL
Qty: 90 TAB | Refills: 0 | Status: SHIPPED | OUTPATIENT
Start: 2017-02-13 | End: 2017-02-22 | Stop reason: SDUPTHER

## 2017-02-24 PROCEDURE — 99284 EMERGENCY DEPT VISIT MOD MDM: CPT

## 2017-02-24 PROCEDURE — 93005 ELECTROCARDIOGRAM TRACING: CPT

## 2017-02-25 ENCOUNTER — APPOINTMENT (OUTPATIENT)
Dept: GENERAL RADIOLOGY | Age: 47
End: 2017-02-25
Attending: EMERGENCY MEDICINE
Payer: COMMERCIAL

## 2017-02-25 ENCOUNTER — HOSPITAL ENCOUNTER (EMERGENCY)
Age: 47
Discharge: HOME | End: 2017-02-25
Attending: EMERGENCY MEDICINE
Payer: COMMERCIAL

## 2017-02-25 VITALS
BODY MASS INDEX: 40.18 KG/M2 | HEIGHT: 66 IN | SYSTOLIC BLOOD PRESSURE: 127 MMHG | RESPIRATION RATE: 18 BRPM | TEMPERATURE: 98.1 F | HEART RATE: 79 BPM | WEIGHT: 250 LBS | DIASTOLIC BLOOD PRESSURE: 58 MMHG | OXYGEN SATURATION: 99 %

## 2017-02-25 DIAGNOSIS — M54.6 LEFT-SIDED THORACIC BACK PAIN, UNSPECIFIED CHRONICITY: ICD-10-CM

## 2017-02-25 DIAGNOSIS — R10.13 ABDOMINAL PAIN, EPIGASTRIC: Primary | ICD-10-CM

## 2017-02-25 DIAGNOSIS — K27.9 PEPTIC ULCER: ICD-10-CM

## 2017-02-25 LAB
ALBUMIN SERPL BCP-MCNC: 3.5 G/DL (ref 3.5–5)
ALBUMIN/GLOB SERPL: 0.9 {RATIO} (ref 1.1–2.2)
ALP SERPL-CCNC: 87 U/L (ref 45–117)
ALT SERPL-CCNC: 18 U/L (ref 12–78)
ANION GAP BLD CALC-SCNC: 7 MMOL/L (ref 5–15)
APPEARANCE UR: ABNORMAL
AST SERPL W P-5'-P-CCNC: 16 U/L (ref 15–37)
ATRIAL RATE: 69 BPM
BACTERIA URNS QL MICRO: NEGATIVE /HPF
BASOPHILS # BLD AUTO: 0 K/UL (ref 0–0.1)
BASOPHILS # BLD: 0 % (ref 0–1)
BILIRUB SERPL-MCNC: 0.1 MG/DL (ref 0.2–1)
BILIRUB UR QL: NEGATIVE
BUN SERPL-MCNC: 11 MG/DL (ref 6–20)
BUN/CREAT SERPL: 12 (ref 12–20)
CALCIUM SERPL-MCNC: 8.8 MG/DL (ref 8.5–10.1)
CALCULATED P AXIS, ECG09: 46 DEGREES
CALCULATED R AXIS, ECG10: 21 DEGREES
CALCULATED T AXIS, ECG11: 30 DEGREES
CHLORIDE SERPL-SCNC: 105 MMOL/L (ref 97–108)
CK SERPL-CCNC: 169 U/L (ref 26–192)
CO2 SERPL-SCNC: 29 MMOL/L (ref 21–32)
COLOR UR: ABNORMAL
CREAT SERPL-MCNC: 0.94 MG/DL (ref 0.55–1.02)
D DIMER PPP FEU-MCNC: 0.38 MG/L FEU (ref 0–0.65)
DIAGNOSIS, 93000: NORMAL
EOSINOPHIL # BLD: 0.4 K/UL (ref 0–0.4)
EOSINOPHIL NFR BLD: 5 % (ref 0–7)
EPITH CASTS URNS QL MICRO: ABNORMAL /LPF
ERYTHROCYTE [DISTWIDTH] IN BLOOD BY AUTOMATED COUNT: 13.5 % (ref 11.5–14.5)
GLOBULIN SER CALC-MCNC: 3.7 G/DL (ref 2–4)
GLUCOSE SERPL-MCNC: 101 MG/DL (ref 65–100)
GLUCOSE UR STRIP.AUTO-MCNC: NEGATIVE MG/DL
HCT VFR BLD AUTO: 38.9 % (ref 35–47)
HGB BLD-MCNC: 12.8 G/DL (ref 11.5–16)
HGB UR QL STRIP: ABNORMAL
HYALINE CASTS URNS QL MICRO: ABNORMAL /LPF (ref 0–5)
KETONES UR QL STRIP.AUTO: NEGATIVE MG/DL
LEUKOCYTE ESTERASE UR QL STRIP.AUTO: ABNORMAL
LIPASE SERPL-CCNC: 112 U/L (ref 73–393)
LYMPHOCYTES # BLD AUTO: 44 % (ref 12–49)
LYMPHOCYTES # BLD: 3.5 K/UL (ref 0.8–3.5)
MCH RBC QN AUTO: 29.1 PG (ref 26–34)
MCHC RBC AUTO-ENTMCNC: 32.9 G/DL (ref 30–36.5)
MCV RBC AUTO: 88.4 FL (ref 80–99)
MONOCYTES # BLD: 0.5 K/UL (ref 0–1)
MONOCYTES NFR BLD AUTO: 6 % (ref 5–13)
NEUTS SEG # BLD: 3.7 K/UL (ref 1.8–8)
NEUTS SEG NFR BLD AUTO: 45 % (ref 32–75)
NITRITE UR QL STRIP.AUTO: NEGATIVE
P-R INTERVAL, ECG05: 142 MS
PH UR STRIP: 5 [PH] (ref 5–8)
PLATELET # BLD AUTO: 282 K/UL (ref 150–400)
POTASSIUM SERPL-SCNC: 4.3 MMOL/L (ref 3.5–5.1)
PROT SERPL-MCNC: 7.2 G/DL (ref 6.4–8.2)
PROT UR STRIP-MCNC: NEGATIVE MG/DL
Q-T INTERVAL, ECG07: 392 MS
QRS DURATION, ECG06: 84 MS
QTC CALCULATION (BEZET), ECG08: 420 MS
RBC # BLD AUTO: 4.4 M/UL (ref 3.8–5.2)
RBC #/AREA URNS HPF: ABNORMAL /HPF (ref 0–5)
SODIUM SERPL-SCNC: 141 MMOL/L (ref 136–145)
SP GR UR REFRACTOMETRY: 1.02 (ref 1–1.03)
TROPONIN I SERPL-MCNC: <0.04 NG/ML
UA: UC IF INDICATED,UAUC: ABNORMAL
UROBILINOGEN UR QL STRIP.AUTO: 0.2 EU/DL (ref 0.2–1)
VENTRICULAR RATE, ECG03: 69 BPM
WBC # BLD AUTO: 8.1 K/UL (ref 3.6–11)
WBC URNS QL MICRO: ABNORMAL /HPF (ref 0–4)

## 2017-02-25 PROCEDURE — 83690 ASSAY OF LIPASE: CPT | Performed by: EMERGENCY MEDICINE

## 2017-02-25 PROCEDURE — 80053 COMPREHEN METABOLIC PANEL: CPT | Performed by: EMERGENCY MEDICINE

## 2017-02-25 PROCEDURE — 85025 COMPLETE CBC W/AUTO DIFF WBC: CPT | Performed by: EMERGENCY MEDICINE

## 2017-02-25 PROCEDURE — 36415 COLL VENOUS BLD VENIPUNCTURE: CPT | Performed by: EMERGENCY MEDICINE

## 2017-02-25 PROCEDURE — 84484 ASSAY OF TROPONIN QUANT: CPT | Performed by: EMERGENCY MEDICINE

## 2017-02-25 PROCEDURE — 85379 FIBRIN DEGRADATION QUANT: CPT | Performed by: EMERGENCY MEDICINE

## 2017-02-25 PROCEDURE — 82550 ASSAY OF CK (CPK): CPT | Performed by: EMERGENCY MEDICINE

## 2017-02-25 PROCEDURE — 71010 XR CHEST PORT: CPT

## 2017-02-25 PROCEDURE — 81001 URINALYSIS AUTO W/SCOPE: CPT | Performed by: EMERGENCY MEDICINE

## 2017-02-25 NOTE — ED NOTES
Patient feels like she has Pinching in her neck and shoulders at 8/10. Abdomen and chest pain with pinching in neck. Pain will come and go with no relieving or exacerbating factors. Patient does have some nausea.
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
## 2017-02-25 NOTE — ED NOTES
Patient received discharge instructions and verbalized understanding. Patient discharged ambulatory out of ED in no apparent distress, vital signs stable at this time.

## 2017-02-25 NOTE — DISCHARGE INSTRUCTIONS
Abdominal Pain: Care Instructions  Your Care Instructions    Abdominal pain has many possible causes. Some aren't serious and get better on their own in a few days. Others need more testing and treatment. If your pain continues or gets worse, you need to be rechecked and may need more tests to find out what is wrong. You may need surgery to correct the problem. Don't ignore new symptoms, such as fever, nausea and vomiting, urination problems, pain that gets worse, and dizziness. These may be signs of a more serious problem. Your doctor may have recommended a follow-up visit in the next 8 to 12 hours. If you are not getting better, you may need more tests or treatment. The doctor has checked you carefully, but problems can develop later. If you notice any problems or new symptoms, get medical treatment right away. Follow-up care is a key part of your treatment and safety. Be sure to make and go to all appointments, and call your doctor if you are having problems. It's also a good idea to know your test results and keep a list of the medicines you take. How can you care for yourself at home? · Rest until you feel better. · To prevent dehydration, drink plenty of fluids, enough so that your urine is light yellow or clear like water. Choose water and other caffeine-free clear liquids until you feel better. If you have kidney, heart, or liver disease and have to limit fluids, talk with your doctor before you increase the amount of fluids you drink. · If your stomach is upset, eat mild foods, such as rice, dry toast or crackers, bananas, and applesauce. Try eating several small meals instead of two or three large ones. · Wait until 48 hours after all symptoms have gone away before you have spicy foods, alcohol, and drinks that contain caffeine. · Do not eat foods that are high in fat. · Avoid anti-inflammatory medicines such as aspirin, ibuprofen (Advil, Motrin), and naproxen (Aleve).  These can cause stomach upset. Talk to your doctor if you take daily aspirin for another health problem. When should you call for help? Call 911 anytime you think you may need emergency care. For example, call if:  · You passed out (lost consciousness). · You pass maroon or very bloody stools. · You vomit blood or what looks like coffee grounds. · You have new, severe belly pain. Call your doctor now or seek immediate medical care if:  · Your pain gets worse, especially if it becomes focused in one area of your belly. · You have a new or higher fever. · Your stools are black and look like tar, or they have streaks of blood. · You have unexpected vaginal bleeding. · You have symptoms of a urinary tract infection. These may include:  ¨ Pain when you urinate. ¨ Urinating more often than usual.  ¨ Blood in your urine. · You are dizzy or lightheaded, or you feel like you may faint. Watch closely for changes in your health, and be sure to contact your doctor if:  · You are not getting better after 1 day (24 hours). Where can you learn more? Go to http://dailyHelloTelgermania.info/. Enter K481 in the search box to learn more about \"Abdominal Pain: Care Instructions. \"  Current as of: May 27, 2016  Content Version: 11.1  © 8138-8177 [employer]. Care instructions adapted under license by [employer] (which disclaims liability or warranty for this information). If you have questions about a medical condition or this instruction, always ask your healthcare professional. [place] [address] any warranty or liability for your use of this information. Back Pain: Care Instructions  Your Care Instructions    Back pain has many possible causes. It is often related to problems with muscles and ligaments of the back. It may also be related to problems with the nerves, discs, or bones of the back.  Moving, lifting, standing, sitting, or sleeping in an awkward way can strain the back. Sometimes you don't notice the injury until later. Arthritis is another common cause of back pain. Although it may hurt a lot, back pain usually improves on its own within several weeks. Most people recover in 12 weeks or less. Using good home treatment and being careful not to stress your back can help you feel better sooner. Follow-up care is a key part of your treatment and safety. Be sure to make and go to all appointments, and call your doctor if you are having problems. Its also a good idea to know your test results and keep a list of the medicines you take. How can you care for yourself at home? · Sit or lie in positions that are most comfortable and reduce your pain. Try one of these positions when you lie down:  ¨ Lie on your back with your knees bent and supported by large pillows. ¨ Lie on the floor with your legs on the seat of a sofa or chair. Neo  on your side with your knees and hips bent and a pillow between your legs. ¨ Lie on your stomach if it does not make pain worse. · Do not sit up in bed, and avoid soft couches and twisted positions. Bed rest can help relieve pain at first, but it delays healing. Avoid bed rest after the first day of back pain. · Change positions every 30 minutes. If you must sit for long periods of time, take breaks from sitting. Get up and walk around, or lie in a comfortable position. · Try using a heating pad on a low or medium setting for 15 to 20 minutes every 2 or 3 hours. Try a warm shower in place of one session with the heating pad. · You can also try an ice pack for 10 to 15 minutes every 2 to 3 hours. Put a thin cloth between the ice pack and your skin. · Take pain medicines exactly as directed. ¨ If the doctor gave you a prescription medicine for pain, take it as prescribed. ¨ If you are not taking a prescription pain medicine, ask your doctor if you can take an over-the-counter medicine. · Take short walks several times a day.  You can start with 5 to 10 minutes, 3 or 4 times a day, and work up to longer walks. Walk on level surfaces and avoid hills and stairs until your back is better. · Return to work and other activities as soon as you can. Continued rest without activity is usually not good for your back. · To prevent future back pain, do exercises to stretch and strengthen your back and stomach. Learn how to use good posture, safe lifting techniques, and proper body mechanics. When should you call for help? Call your doctor now or seek immediate medical care if:  · You have new or worsening numbness in your legs. · You have new or worsening weakness in your legs. (This could make it hard to stand up.)  · You lose control of your bladder or bowels. Watch closely for changes in your health, and be sure to contact your doctor if:  · Your pain gets worse. · You are not getting better after 2 weeks. Where can you learn more? Go to http://daily-germania.info/. Enter M344 in the search box to learn more about \"Back Pain: Care Instructions. \"  Current as of: May 23, 2016  Content Version: 11.1  © 4864-4433 [employer]. Care instructions adapted under license by [employer] (which disclaims liability or warranty for this information). If you have questions about a medical condition or this instruction, always ask your healthcare professional. [address] any warranty or liability for your use of this information.

## 2017-03-27 ENCOUNTER — OFFICE VISIT (OUTPATIENT)
Dept: CARDIOLOGY CLINIC | Age: 47
End: 2017-03-27

## 2017-03-27 VITALS
RESPIRATION RATE: 16 BRPM | SYSTOLIC BLOOD PRESSURE: 120 MMHG | HEIGHT: 66 IN | WEIGHT: 255.4 LBS | DIASTOLIC BLOOD PRESSURE: 92 MMHG | HEART RATE: 60 BPM | BODY MASS INDEX: 41.05 KG/M2 | OXYGEN SATURATION: 97 %

## 2017-03-27 DIAGNOSIS — I25.10 ATHEROSCLEROSIS OF NATIVE CORONARY ARTERY OF NATIVE HEART WITHOUT ANGINA PECTORIS: Primary | ICD-10-CM

## 2017-03-27 DIAGNOSIS — E78.5 HYPERLIPIDEMIA WITH TARGET LDL LESS THAN 70: ICD-10-CM

## 2017-03-27 DIAGNOSIS — K27.9 PUD (PEPTIC ULCER DISEASE): ICD-10-CM

## 2017-03-27 DIAGNOSIS — Z95.5 S/P CORONARY ARTERY STENT PLACEMENT: ICD-10-CM

## 2017-03-27 DIAGNOSIS — Z82.49 FAMILY HISTORY OF ISCHEMIC HEART DISEASE: ICD-10-CM

## 2017-03-27 DIAGNOSIS — I10 ESSENTIAL HYPERTENSION: ICD-10-CM

## 2017-03-27 DIAGNOSIS — Z78.9 STATIN INTOLERANCE: ICD-10-CM
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
## 2017-03-27 NOTE — MR AVS SNAPSHOT
Visit Information Date & Time Provider Department Dept. Phone Encounter #  
 3/27/2017  4:00 PM Rhiannon Gilman, [address] [facility] [phone] [id number] Your Appointments 4/19/2017  8:15 AM  
LAB with LAB [facility] ([facility] [place]) Appt Note: Dr. [employer] fasting [address] [facility] [address]  
  
   
 [address]  
  
    
 4/27/2017  3:00 PM  
COMPLETE PHYSICAL with Stephanie Ruiz DO [employer] ([facility] [place]) Appt Note: Cpe; no pap [address] [facility] [address]  
[phone]  
  
   
 [address]  
  
    
 6/5/2017  2:45 PM  
ROUTINE CARE with Stephanie Ruiz DO [employer] ([facility] [place]) Appt Note: 4 MO F/U Weight, Referral F/U  
 [address] 
[address] [facility] [address]  
[phone]  
  
   
 [address] Upcoming Health Maintenance Date Due  
 PAP AKA CERVICAL CYTOLOGY 6/16/2017* DTaP/Tdap/Td series (2 - Td) 1/31/2027 *Topic was postponed. The date shown is not the original due date. Allergies as of 3/27/2017  Review Complete On: 3/27/2017 By: Rhiannon Gilman MD  
  
 Severity Noted Reaction Type Reactions Amitiza [Lubiprostone]  10/23/2009    Nausea Only Lipitor [Atorvastatin]  01/08/2014    Myalgia Memory disturbance Current Immunizations  Reviewed on 1/31/2017 Name Date Td 4/1/2013 Not reviewed this visit You Were Diagnosed With   
  
 Codes Comments Atherosclerosis of native coronary artery of native heart without angina pectoris    -  Primary ICD-10-CM: I25.10 ICD-9-CM: 414.01 Essential hypertension     ICD-10-CM: I10 
ICD-9-CM: 401.9 Hyperlipidemia with target LDL less than 70     ICD-10-CM: E78.5 ICD-9-CM: 272.4 Statin intolerance     ICD-10-CM: Z78.9 ICD-9-CM: 995.27 S/P coronary artery stent placement     ICD-10-CM: Z95.5 ICD-9-CM: V45.82 Family history of ischemic heart disease     ICD-10-CM: Z82.49 
ICD-9-CM: V17.3 Vitals BP Pulse Resp Height(growth percentile) Weight(growth percentile) SpO2  
 (!) 120/92 (BP 1 Location: Right arm, BP Patient Position: Sitting) 60 16 5' 6\" (1.676 m) 255 lb 6.4 oz (115.8 kg) 97% BMI OB Status Smoking Status 41.22 kg/m2 Having regular periods Never Smoker Vitals History BMI and BSA Data Body Mass Index Body Surface Area  
 41.22 kg/m 2 2.32 m 2 Preferred Pharmacy Pharmacy Name Phone [id number] [place] [phone] Your Updated Medication List  
  
   
This list is accurate as of: 3/27/17  4:45 PM.  Always use your most recent med list.  
  
  
  
  
 aspirin delayed-release 81 mg tablet Take 1 Tab by mouth daily. CRESTOR 20 mg tablet Generic drug:  rosuvastatin TAKE 1 TABLET BY MOUTH AT BEDTIME  
  
 fluticasone 50 mcg/actuation nasal spray Commonly known as:  Chioma Callshruthi INSTILL 1 SPRAY IN EACH NOSTRIL TWICE DAILY LINZESS 290 mcg Cap capsule Generic drug:  linaclotide  
as needed. D. Manetas  
  
 metoprolol tartrate 25 mg tablet Commonly known as:  LOPRESSOR  
TAKE 1/2 TABLET BY MOUTH TWICE A DAY  
  
 omega-3 fatty acids-vitamin e 1,000 mg Cap Commonly known as:  FISH OIL Take 1 Cap by mouth daily. Take 1 tablet daily x 2 weeks. Increase to 2 tablets daily PHAZYME 180 mg Cap Generic drug:  simethicone Take  by mouth. PROTONIX 40 mg tablet Generic drug:  pantoprazole Take 40 mg by mouth daily. sucralfate 100 mg/mL suspension Commonly known as:  Adah Dory Take  by mouth four (4) times daily. ZANTAC 150 mg tablet Generic drug:  raNITIdine Take 150 mg by mouth two (2) times a day. We Performed the Following AMB POC EKG ROUTINE W/ 12 LEADS, INTER & REP [11153 CPT(R)] Introducing [facility] SERVICES! Dear Rajinder Bellamy: Thank you for requesting a [employer] account. Our records indicate that you already have an active [employer] account. You can access your account anytime at [email or web address] Did you know that you can access your hospital and ER discharge instructions at any time in [employer]? You can also review all of your test results from your hospital stay or ER visit. Additional Information If you have questions, please visit the Frequently Asked Questions section of the [employer] website at [email or web address] Remember, [employer] is NOT to be used for urgent needs. For medical emergencies, dial 911. Now available from your iPhone and Android! Please provide this summary of care documentation to your next provider. Your primary care clinician is listed as Stephania Schmidt. If you have any questions after today's visit, please call [phone].

## 2017-04-18 ENCOUNTER — TELEPHONE (OUTPATIENT)
Dept: FAMILY MEDICINE CLINIC | Age: 47
End: 2017-04-18

## 2017-04-18 NOTE — TELEPHONE ENCOUNTER
Called pt to reschedule her appt for 4/27/2017 offered several appt. Pt  Stated she wanted first appt of the morning or last appt of the day. I also spoke with Dr Norma Blakely and Verena Camacho about the appt. Pt wants nurse to call her back.

## 2017-04-19 NOTE — TELEPHONE ENCOUNTER
Spoke with patient, ID verified X 2. Patient stated that she is very upset that no one is working with her to schedule her appointment. Patient stated that she would like to be the first patient in the morning or the last patient of the day. Patient stated that her appointment has been changed 2 times and no one is trying to work with her. Informed patient that Dr. Garth Hennessy valentino solorzano has appointments scheduled for the month of May and the earliest that I can get her in would be June due her appointment request of being the very first patient in the morning or the last patient in the afternoon. Patient stated that someone needs to work around her schedule to get her in quicker. Informed patient would she like to speak to the  and she stated yes. Call was transferred to the . Patient all ready has an appointment scheduled for 06- that was scheduled on January 31, 2017.

## 2017-04-25 NOTE — TELEPHONE ENCOUNTER
Writer called patient to schedule a sooner appointment then June 5th.  was able to schedule appointment for patient to see Dr. Олег Johnson on 4/26/17 at 4:30pm. Patient verbalized and agreed to appointment time.

## 2017-04-26 ENCOUNTER — OFFICE VISIT (OUTPATIENT)
Dept: FAMILY MEDICINE CLINIC | Age: 47
End: 2017-04-26

## 2017-04-26 VITALS
DIASTOLIC BLOOD PRESSURE: 74 MMHG | SYSTOLIC BLOOD PRESSURE: 114 MMHG | OXYGEN SATURATION: 98 % | HEART RATE: 70 BPM | RESPIRATION RATE: 18 BRPM | WEIGHT: 256.4 LBS | HEIGHT: 66 IN | TEMPERATURE: 97.3 F | BODY MASS INDEX: 41.21 KG/M2

## 2017-04-26 DIAGNOSIS — G47.429 NARCOLEPSY DUE TO UNDERLYING CONDITION WITHOUT CATAPLEXY: ICD-10-CM

## 2017-04-26 DIAGNOSIS — R41.3 MEMORY CHANGES: ICD-10-CM

## 2017-04-26 DIAGNOSIS — N93.8 DUB (DYSFUNCTIONAL UTERINE BLEEDING): ICD-10-CM

## 2017-04-26 DIAGNOSIS — R07.9 CHEST PAIN, UNSPECIFIED TYPE: ICD-10-CM

## 2017-04-26 DIAGNOSIS — I10 ESSENTIAL HYPERTENSION: Primary | ICD-10-CM

## 2017-04-26 DIAGNOSIS — Z78.9 STATIN INTOLERANCE: ICD-10-CM

## 2017-04-26 DIAGNOSIS — Z82.49 FAMILY HISTORY OF HEART ATTACK: ICD-10-CM

## 2017-04-26 DIAGNOSIS — R35.1 NOCTURIA: ICD-10-CM

## 2017-04-26 DIAGNOSIS — E78.5 HYPERLIPIDEMIA WITH TARGET LDL LESS THAN 70: ICD-10-CM

## 2017-04-26 DIAGNOSIS — H10.13 ALLERGIC CONJUNCTIVITIS AND RHINITIS, BILATERAL: ICD-10-CM

## 2017-04-26 DIAGNOSIS — R53.82 CHRONIC FATIGUE: ICD-10-CM

## 2017-04-26 DIAGNOSIS — Z95.5 S/P CORONARY ARTERY STENT PLACEMENT: ICD-10-CM

## 2017-04-26 DIAGNOSIS — M25.512 ACUTE PAIN OF LEFT SHOULDER: ICD-10-CM

## 2017-04-26 DIAGNOSIS — R00.2 HEART PALPITATIONS: ICD-10-CM

## 2017-04-26 DIAGNOSIS — J01.11 ACUTE RECURRENT FRONTAL SINUSITIS: ICD-10-CM

## 2017-04-26 DIAGNOSIS — E66.01 OBESITY, CLASS III, BMI 40-49.9 (MORBID OBESITY) (HCC): ICD-10-CM

## 2017-04-26 DIAGNOSIS — R31.29 MICROSCOPIC HEMATURIA: ICD-10-CM

## 2017-04-26 DIAGNOSIS — J30.9 ALLERGIC CONJUNCTIVITIS AND RHINITIS, BILATERAL: ICD-10-CM

## 2017-04-26 DIAGNOSIS — R06.83 SNORING: ICD-10-CM

## 2017-04-26 DIAGNOSIS — Z90.49 S/P CHOLECYSTECTOMY: ICD-10-CM

## 2017-04-26 DIAGNOSIS — I25.10 ATHEROSCLEROSIS OF NATIVE CORONARY ARTERY OF NATIVE HEART WITHOUT ANGINA PECTORIS: ICD-10-CM

## 2017-04-26 DIAGNOSIS — K59.09 OTHER CONSTIPATION: ICD-10-CM

## 2017-04-26 DIAGNOSIS — R10.13 EPIGASTRIC PAIN: ICD-10-CM

## 2017-04-26 DIAGNOSIS — J45.20 RAD (REACTIVE AIRWAY DISEASE) WITH WHEEZING, MILD INTERMITTENT, UNCOMPLICATED: ICD-10-CM

## 2017-04-26 DIAGNOSIS — F51.04 CHRONIC INSOMNIA: ICD-10-CM

## 2017-04-26 DIAGNOSIS — I20.0 UNSTABLE ANGINA PECTORIS (HCC): ICD-10-CM

## 2017-04-26 DIAGNOSIS — Z82.49 FAMILY HISTORY OF ISCHEMIC HEART DISEASE: ICD-10-CM

## 2017-04-26 DIAGNOSIS — K27.9 PUD (PEPTIC ULCER DISEASE): ICD-10-CM

## 2017-04-26 RX ORDER — ALBUTEROL SULFATE 90 UG/1
2 AEROSOL, METERED RESPIRATORY (INHALATION)
Qty: 1 INHALER | Refills: 1 | Status: SHIPPED | OUTPATIENT
Start: 2017-04-26 | End: 2018-04-03 | Stop reason: ALTCHOICE

## 2017-04-26 RX ORDER — PREDNISONE 10 MG/1
TABLET ORAL
COMMUNITY
Start: 2017-04-13 | End: 2018-04-03 | Stop reason: ALTCHOICE

## 2017-04-26 RX ORDER — ESOMEPRAZOLE MAGNESIUM 40 MG/1
CAPSULE, DELAYED RELEASE ORAL AS NEEDED
Refills: 4 | COMMUNITY
Start: 2017-02-13

## 2017-04-26 RX ORDER — MONTELUKAST SODIUM 10 MG/1
10 TABLET ORAL DAILY
Qty: 30 TAB | Refills: 11 | Status: SHIPPED | OUTPATIENT
Start: 2017-04-26 | End: 2017-06-14 | Stop reason: SDUPTHER

## 2017-04-26 NOTE — MR AVS SNAPSHOT
Visit Information Date & Time Provider Department Dept. Phone Encounter #  
 4/26/2017  4:30 PM [facility] [phone] [id number] Follow-up Instructions Return in about 3 months (around 7/26/2017) for referral follow up, allergies, . Upcoming Health Maintenance Date Due  
 PAP AKA CERVICAL CYTOLOGY 6/16/2017* DTaP/Tdap/Td series (2 - Td) 1/31/2027 *Topic was postponed. The date shown is not the original due date. Allergies as of 4/26/2017  Review Complete On: 4/26/2017 By: Berry Marlow Severity Noted Reaction Type Reactions Amitiza [Lubiprostone]  10/23/2009    Nausea Only Lipitor [Atorvastatin]  01/08/2014    Myalgia Memory disturbance Nsaids (Non-steroidal Anti-inflammatory Drug)  04/26/2017    Other (comments) AVOID DUE TO SEVERE PUD Current Immunizations  Reviewed on 4/26/2017 Name Date Td 4/1/2013 Reviewed by Chinyere Celis DO on 4/26/2017 at  7:02 PM  
You Were Diagnosed With   
  
 Codes Comments Essential hypertension    -  Primary ICD-10-CM: I10 
ICD-9-CM: 401.9 stable Chronic insomnia     ICD-10-CM: F51.04 
ICD-9-CM: 780.52 due to nocturia vs eating vs dreaming vs other Chest pain, unspecified type     ICD-10-CM: R07.9 ICD-9-CM: 786.50 due to RAD vs cardio vs GI vs Hiatal Hernia vs other, intermittently Epigastric pain     ICD-10-CM: R10.13 ICD-9-CM: 789.06 due to Hiatal Hernia vs PUD vs other Nocturia     ICD-10-CM: R35.1 ICD-9-CM: 788.43 Microscopic hematuria     ICD-10-CM: R31.29 ICD-9-CM: 599.72   
 RAD (reactive airway disease) with wheezing, mild intermittent, uncomplicated     VOV-47-KE: J45.20 ICD-9-CM: 493.90 Unstable angina pectoris ([facility] [address]     ICD-10-CM: I20.0 ICD-9-CM: 411.1 Snoring     ICD-10-CM: R06.83 
ICD-9-CM: 786.09 Chronic fatigue     ICD-10-CM: R53.82 
ICD-9-CM: 780.79 due to insomnia vs allergies vs other Narcolepsy due to underlying condition without cataplexy     ICD-10-CM: G47.429 ICD-9-CM: 347.10   
 DUB (dysfunctional uterine bleeding)     ICD-10-CM: N93.8 ICD-9-CM: 626.8 due to perimenopause vs other Allergic conjunctivitis and rhinitis, bilateral     ICD-10-CM: H10.13, J30.9 ICD-9-CM: 372.05, 477.9 Statin intolerance     ICD-10-CM: Z78.9 ICD-9-CM: 995.27 Family history of heart attack     ICD-10-CM: Z82.49 
ICD-9-CM: V17.3 Heart palpitations     ICD-10-CM: R00.2 ICD-9-CM: 785.1 Atherosclerosis of native coronary artery of native heart without angina pectoris     ICD-10-CM: I25.10 ICD-9-CM: 414.01   
 S/P coronary artery stent placement     ICD-10-CM: Z95.5 ICD-9-CM: V45.82 Hyperlipidemia with target LDL less than 70     ICD-10-CM: E78.5 ICD-9-CM: 272.4 Family history of ischemic heart disease     ICD-10-CM: Z82.49 
ICD-9-CM: V17.3 PUD (peptic ulcer disease)     ICD-10-CM: K27.9 ICD-9-CM: 533.90 Memory changes     ICD-10-CM: R41.3 ICD-9-CM: 780.93 due to Lipitor vs Crestor, improved since off Lipitor and taking Crestor Acute pain of left shoulder     ICD-10-CM: M25.512 ICD-9-CM: 719.41 into LUE, resolved with exercise Obesity, Class III, BMI 40-49.9 (morbid obesity) (HCC)     ICD-10-CM: E66.01 
ICD-9-CM: 278.01 Other constipation     ICD-10-CM: K59.09 
ICD-9-CM: 564.09 Acute recurrent frontal sinusitis     ICD-10-CM: J01.11 
ICD-9-CM: 461.1 stable without oral prednisone S/P cholecystectomy     ICD-10-CM: Z90.49 ICD-9-CM: V45.79 Vitals BP Pulse Temp Resp Height(growth percentile) Weight(growth percentile) 114/74 (BP 1 Location: Right arm, BP Patient Position: Sitting) 70 97.3 °F (36.3 °C) (Oral) 18 5' 5.98\" (1.676 m) 256 lb 6.4 oz (116.3 kg) SpO2 BMI OB Status Smoking Status 98% 41.4 kg/m2 Having regular periods Never Smoker Vitals History BMI and BSA Data Body Mass Index Body Surface Area 41.4 kg/m 2 2.33 m 2 Preferred Pharmacy Pharmacy Name Phone [id number] Irma Valenzuela, [address] [facility] [phone] Your Updated Medication List  
  
   
This list is accurate as of: 4/26/17  7:11 PM.  Always use your most recent med list.  
  
  
  
  
 albuterol 90 mcg/actuation inhaler Commonly known as:  PROVENTIL HFA, VENTOLIN HFA, PROAIR HFA Take 2 Puffs by inhalation every four (4) hours as needed for Wheezing. Indications: BRONCHOSPASM PREVENTION  
  
 aspirin delayed-release 81 mg tablet Take 1 Tab by mouth daily. CRESTOR 20 mg tablet Generic drug:  rosuvastatin TAKE 1 TABLET BY MOUTH AT BEDTIME  
  
 esomeprazole 40 mg capsule Commonly known as:  NEXIUM  
TAKE ONE CAPSULE BY MOUTH DAILY 1/2 HR BEFORE BREAKFAST  
  
 fluticasone 50 mcg/actuation nasal spray Commonly known as:  Hitesh Marrow INSTILL 1 SPRAY IN EACH NOSTRIL TWICE DAILY LINZESS 290 mcg Cap capsule Generic drug:  linaclotide  
as needed. LIZBET Dodge  
  
 metoprolol tartrate 25 mg tablet Commonly known as:  LOPRESSOR  
TAKE 1/2 TABLET BY MOUTH TWICE A DAY  
  
 montelukast 10 mg tablet Commonly known as:  SINGULAIR Take 1 Tab by mouth daily. Indications: ALLERGIC RHINITIS  
  
 omega-3 fatty acids-vitamin e 1,000 mg Cap Commonly known as:  FISH OIL Take 1 Cap by mouth daily. Take 1 tablet daily x 2 weeks. Increase to 2 tablets daily PHAZYME 180 mg Cap Generic drug:  simethicone Take  by mouth.  
  
 predniSONE 10 mg tablet Commonly known as:  DELTASONE  
  
 PROTONIX 40 mg tablet Generic drug:  pantoprazole Take 40 mg by mouth daily. sucralfate 100 mg/mL suspension Commonly known as:  Shearon Love Take  by mouth four (4) times daily. ZANTAC 150 mg tablet Generic drug:  raNITIdine Take 150 mg by mouth two (2) times a day. Prescriptions Sent to Pharmacy  Refills  
 montelukast (SINGULAIR) 10 mg tablet 11  
 Sig: Take 1 Tab by mouth daily. Indications: ALLERGIC RHINITIS Class: Normal  
 Pharmacy: [address] [place] [address] Ph #: [phone] Route: Oral  
 albuterol (PROVENTIL HFA, VENTOLIN HFA, PROAIR HFA) 90 mcg/actuation inhaler 1 Sig: Take 2 Puffs by inhalation every four (4) hours as needed for Wheezing. Indications: BRONCHOSPASM PREVENTION Class: Normal  
 Pharmacy: [address] [place] [address] Ph #: [phone] Route: Inhalation We Performed the Following AMB POC EKG ROUTINE W/ 12 LEADS, INTER & REP [06851 CPT(R)] CULTURE, URINE [id number] CPT(R)] MICROALBUMIN, UR, RAND W/ MICROALBUMIN/CREA RATIO [id number] CPT(R)] REFERRAL TO GASTROENTEROLOGY [HCY44 Custom] Comments:  
 Please evaluate patient for epigastric abd pain, PUD REFERRAL TO SLEEP STUDIES [REF99 Custom] Comments:  
 Please evaluate patient for insomnia, ?narcolepsy, snoring, fatigue URINALYSIS W/ RFLX MICROSCOPIC [06435 CPT(R)] Follow-up Instructions Return in about 3 months (around 7/26/2017) for referral follow up, allergies, . Referral Information Referral ID Referred By Referred To  
  
 [id number] Aniceto Crump, [address] [place] [address] Phone: [phone] Fax: [phone] Visits Status Start Date End Date 1 New Request 4/26/17 4/26/18 If your referral has a status of pending review or denied, additional information will be sent to support the outcome of this decision. Referral ID Referred By Referred To  
 [id number] Phan JAIN [address] [facility] [address], [address] Visits Status Start Date End Date 1 New Request 4/26/17 4/26/18  If your referral has a status of pending review or denied, additional information will be sent to support the outcome of this decision. Patient Instructions Seasonal Allergies: Care Instructions Your Care Instructions Allergies occur when your body's defense system (immune system) overreacts to certain substances. The immune system treats a harmless substance as if it were a harmful germ or virus. Many things can cause this to happen. Examples include pollens, medicine, food, dust, animal dander, and mold. Your allergies are seasonal if you have symptoms just at certain times of the year. In that case, you are probably allergic to pollens from certain trees, grasses, or weeds. Allergies can be mild or severe. Over-the-counter allergy medicine may help with some symptoms. Read and follow all instructions on the label. Managing your allergies is an important part of staying healthy. Your doctor may suggest that you have tests to help find the cause of your allergies. When you know what things trigger your symptoms, you can avoid them. This can prevent allergy symptoms and other health problems. In some cases, immunotherapy might help. For this treatment, you get shots or use pills that have a small amount of certain allergens in them. Your body \"gets used to\" the allergen, so you react less to it over time. This kind of treatment may help prevent or reduce some allergy symptoms. Follow-up care is a key part of your treatment and safety. Be sure to make and go to all appointments, and call your doctor if you are having problems. It's also a good idea to know your test results and keep a list of the medicines you take. How can you care for yourself at home? · Be safe with medicines. Take your medicines exactly as prescribed. Call your doctor if you think you are having a problem with your medicine. · During your allergy season, keep windows closed. If you need to use air-conditioning, change or clean all filters every month.  Take a shower and change your clothes after you have been outside. · Stay inside when pollen counts are high. Vacuum once or twice a week. Use a vacuum  with a HEPA filter or a double-thickness filter. When should you call for help? Call 911 anytime you think you may need emergency care. For example, call if: 
· You have symptoms of a severe allergic reaction. These may include: 
¨ Sudden raised, red areas (hives) all over your body. ¨ Swelling of the throat, mouth, lips, or tongue. ¨ Trouble breathing. ¨ Passing out (losing consciousness). Or you may feel very lightheaded or suddenly feel weak, confused, or restless. Watch closely for changes in your health, and be sure to contact your doctor if: 
· You need help controlling your allergies. · You have questions about allergy testing. · You do not get better as expected. Where can you learn more? Go to http://daily-germania.info/. Enter J912 in the search box to learn more about \"Seasonal Allergies: Care Instructions. \" Current as of: February 12, 2016 Content Version: 11.2 © 5515-3203 [employer]. Care instructions adapted under license by [employer] (which disclaims liability or warranty for this information). If you have questions about a medical condition or this instruction, always ask your healthcare professional. [place] [address] any warranty or liability for your use of this information. Indigestion (Dyspepsia or Heartburn): Care Instructions Your Care Instructions Sometimes it can be hard to pinpoint the cause of indigestion (dyspepsia or heartburn). Most cases of an upset stomach with bloating, burning, burping, and nausea are minor and go away within several hours. Home treatment and over-the-counter medicine often are able to control symptoms.  But if you take medicine to relieve your indigestion without making diet and lifestyle changes, your symptoms are likely to return again and again. If you get indigestion often, it may be a sign of a more serious medical problem. Be sure to follow up with your doctor, who may want to do tests to be sure of the cause of your indigestion. Follow-up care is a key part of your treatment and safety. Be sure to make and go to all appointments, and call your doctor if you are having problems. It's also a good idea to know your test results and keep a list of the medicines you take. How can you care for yourself at home? · Your doctor may recommend over-the-counter medicine. For mild or occasional indigestion, antacids such as Tums, Gaviscon, Mylanta, or Maalox may help. Be careful when you take over-the-counter antacid medicines. Many of these medicines have aspirin in them. Read the label to make sure that you are not taking more than the recommended dose. Too much aspirin can be harmful. · Your doctor also may recommend over-the-counter acid reducers, such as Pepcid AC, Tagamet HB, Zantac 75, or Prilosec. Read and follow all instructions on the label. If you use these medicines often, talk with your doctor. · Change your eating habits. ¨ It's best to eat several small meals instead of two or three large meals. ¨ After you eat, wait 2 to 3 hours before you lie down. ¨ Chocolate, mint, and alcohol can make GERD worse. ¨ Spicy foods, foods that have a lot of acid (like tomatoes and oranges), and coffee can make GERD symptoms worse in some people. If your symptoms are worse after you eat a certain food, you may want to stop eating that food to see if your symptoms get better. · Do not smoke or chew tobacco. Smoking can make GERD worse. If you need help quitting, talk to your doctor about stop-smoking programs and medicines. These can increase your chances of quitting for good.  
· If you have GERD symptoms at night, raise the head of your bed 6 to 8 inches by putting the frame on blocks or placing a foam wedge under the head of your mattress. (Adding extra pillows does not work.) · Do not wear tight clothing around your middle. · Lose weight if you need to. Losing just 5 to 10 pounds can help. · Do not take anti-inflammatory medicines, such as aspirin, ibuprofen (Advil, Motrin), or naproxen (Aleve). These can irritate the stomach. If you need a pain medicine, try acetaminophen (Tylenol), which does not cause stomach upset. When should you call for help? Call 911 anytime you think you may need emergency care. For example, call if: 
· You passed out (lost consciousness). · You vomit blood or what looks like coffee grounds. · You pass maroon or very bloody stools. · You have chest pain or pressure. This may occur with: ¨ Sweating. ¨ Shortness of breath. ¨ Nausea or vomiting. ¨ Pain that spreads from the chest to the neck, jaw, or one or both shoulders or arms. ¨ Feeling dizzy or lightheaded. ¨ A fast or uneven pulse. After calling 911, chew 1 adult-strength aspirin. Wait for an ambulance. Do not try to drive yourself. Call your doctor now or seek immediate medical care if: 
· You have severe belly pain. · Your stools are black and tarlike or have streaks of blood. · You have trouble swallowing. · You are losing weight and do not know why. Watch closely for changes in your health, and be sure to contact your doctor if: 
· You do not get better as expected. Where can you learn more? Go to http://daily-germania.info/. Enter J014 in the search box to learn more about \"Indigestion (Dyspepsia or Heartburn): Care Instructions. \" Current as of: November 16, 2016 Content Version: 11.2 © 0289-5592 [employer]. Care instructions adapted under license by [employer] (which disclaims liability or warranty for this information).  If you have questions about a medical condition or this instruction, always ask your healthcare professional. [employer] disclaims any warranty or liability for your use of this information. HEARTBURN occurs when stomach acid moves back up through your esophagus. Several conditions and foods can contribute to this process. Common causes include: 
 
Hiatal Hernia Pregnancy Alcohol use Overweight or Obesity Smoking. Consuming certain types of foods or drinks can increase the acid content of the stomach and cause heartburn. AVOID THESE TRIGGERS:  
 
Stress Alcoholic or carbonated beverages Caffeine (coffee, tea, chocolate, soda) Acidic foods or drinks (Oranges or orange juice, apples, apple juice, grapefruit or grapefruit juice, bananas, grape juice) Tomatoes or tomato based foods (pizza, spaghetti, chili) Greasy, Fatty or Fried foods Spicy Foods (including hot sauce) Garlic Onions Mint flavoring (peppermint, speramint, cinnamon). PREVENTIVE MEASURES Do not wear tight, restrictive clothing. Lose weight. Elevate the head of the bed 4 to 6 inches with blocks or a wedge pillow. Wait 2 to 3 hours after a meal before lying down. Avoid the triggers. MEDICATIONS Try over the counter medications if needed first.  These include: 
TUMS, ROLAIDS, Mylanta, Prilosec 20 mg, Pepcid 20 mg, Tagamet, Zantac up to 150 mg twice daily or 300 mg daily, Nexium 20 mg up to 40 mg daily and Prevacid up to 30 mg daily. Ideally, take for 2 weeks after symptoms consistently appear. NOTIFY OUR OFFICE If symptoms worsen or persist.  
If breathing or swallowing becomes difficult. If you regurgitate blood. DIAL 911 IF THE HEART BURN SYMPTOMS ARE ACCOMPANIED BY Shortness of breath, sweating, pain in the jaw, neck or arm, cold clammy feeling with nausea or vomiting. This could be a HEART ATTACK. Indigestion (Dyspepsia or Heartburn): Care Instructions Your Care Instructions Sometimes it can be hard to pinpoint the cause of indigestion (dyspepsia or heartburn). Most cases of an upset stomach with bloating, burning, burping, and nausea are minor and go away within several hours. Home treatment and over-the-counter medicine often are able to control symptoms. But if you take medicine to relieve your indigestion without making diet and lifestyle changes, your symptoms are likely to return again and again. If you get indigestion often, it may be a sign of a more serious medical problem. Be sure to follow up with your doctor, who may want to do tests to be sure of the cause of your indigestion. Follow-up care is a key part of your treatment and safety. Be sure to make and go to all appointments, and call your doctor if you are having problems. It's also a good idea to know your test results and keep a list of the medicines you take. How can you care for yourself at home? · Your doctor may recommend over-the-counter medicine. For mild or occasional indigestion, antacids such as Tums, Gaviscon, Mylanta, or Maalox may help. Be careful when you take over-the-counter antacid medicines. Many of these medicines have aspirin in them. Read the label to make sure that you are not taking more than the recommended dose. Too much aspirin can be harmful. · Your doctor also may recommend over-the-counter acid reducers, such as Pepcid AC, Tagamet HB, Zantac 75, or Prilosec. Read and follow all instructions on the label. If you use these medicines often, talk with your doctor. · Change your eating habits. ¨ It's best to eat several small meals instead of two or three large meals. ¨ After you eat, wait 2 to 3 hours before you lie down. ¨ Chocolate, mint, and alcohol can make GERD worse. ¨ Spicy foods, foods that have a lot of acid (like tomatoes and oranges), and coffee can make GERD symptoms worse in some people. If your symptoms are worse after you eat a certain food, you may want to stop eating that food to see if your symptoms get better. · Do not smoke or chew tobacco. Smoking can make GERD worse. If you need help quitting, talk to your doctor about stop-smoking programs and medicines. These can increase your chances of quitting for good. · If you have GERD symptoms at night, raise the head of your bed 6 to 8 inches by putting the frame on blocks or placing a foam wedge under the head of your mattress. (Adding extra pillows does not work.) · Do not wear tight clothing around your middle. · Lose weight if you need to. Losing just 5 to 10 pounds can help. · Do not take anti-inflammatory medicines, such as aspirin, ibuprofen (Advil, Motrin), or naproxen (Aleve). These can irritate the stomach. If you need a pain medicine, try acetaminophen (Tylenol), which does not cause stomach upset. When should you call for help? Call 911 anytime you think you may need emergency care. For example, call if: 
· You passed out (lost consciousness). · You vomit blood or what looks like coffee grounds. · You pass maroon or very bloody stools. · You have chest pain or pressure. This may occur with: ¨ Sweating. ¨ Shortness of breath. ¨ Nausea or vomiting. ¨ Pain that spreads from the chest to the neck, jaw, or one or both shoulders or arms. ¨ Feeling dizzy or lightheaded. ¨ A fast or uneven pulse. After calling 911, chew 1 adult-strength aspirin. Wait for an ambulance. Do not try to drive yourself. Call your doctor now or seek immediate medical care if: 
· You have severe belly pain. · Your stools are black and tarlike or have streaks of blood. · You have trouble swallowing. · You are losing weight and do not know why. Watch closely for changes in your health, and be sure to contact your doctor if: 
· You do not get better as expected. Where can you learn more? Go to http://daily-germania.info/. Enter O538 in the search box to learn more about \"Indigestion (Dyspepsia or Heartburn): Care Instructions. \" 
 Current as of: November 16, 2016 Content Version: 11.2 © 5760-6182 [employer]. Care instructions adapted under license by [employer] (which disclaims liability or warranty for this information). If you have questions about a medical condition or this instruction, always ask your healthcare professional. [address] any warranty or liability for your use of this information. Indigestion (Dyspepsia or Heartburn): Care Instructions Your Care Instructions Sometimes it can be hard to pinpoint the cause of indigestion (dyspepsia or heartburn). Most cases of an upset stomach with bloating, burning, burping, and nausea are minor and go away within several hours. Home treatment and over-the-counter medicine often are able to control symptoms. But if you take medicine to relieve your indigestion without making diet and lifestyle changes, your symptoms are likely to return again and again. If you get indigestion often, it may be a sign of a more serious medical problem. Be sure to follow up with your doctor, who may want to do tests to be sure of the cause of your indigestion. Follow-up care is a key part of your treatment and safety. Be sure to make and go to all appointments, and call your doctor if you are having problems. It's also a good idea to know your test results and keep a list of the medicines you take. How can you care for yourself at home? · Your doctor may recommend over-the-counter medicine. For mild or occasional indigestion, antacids such as Tums, Gaviscon, Mylanta, or Maalox may help. Be careful when you take over-the-counter antacid medicines. Many of these medicines have aspirin in them. Read the label to make sure that you are not taking more than the recommended dose. Too much aspirin can be harmful. · Your doctor also may recommend over-the-counter acid reducers, such as Pepcid AC, Tagamet HB, Zantac 75, or Prilosec.  Read and follow all instructions on the label. If you use these medicines often, talk with your doctor. · Change your eating habits. ¨ It's best to eat several small meals instead of two or three large meals. ¨ After you eat, wait 2 to 3 hours before you lie down. ¨ Chocolate, mint, and alcohol can make GERD worse. ¨ Spicy foods, foods that have a lot of acid (like tomatoes and oranges), and coffee can make GERD symptoms worse in some people. If your symptoms are worse after you eat a certain food, you may want to stop eating that food to see if your symptoms get better. · Do not smoke or chew tobacco. Smoking can make GERD worse. If you need help quitting, talk to your doctor about stop-smoking programs and medicines. These can increase your chances of quitting for good. · If you have GERD symptoms at night, raise the head of your bed 6 to 8 inches by putting the frame on blocks or placing a foam wedge under the head of your mattress. (Adding extra pillows does not work.) · Do not wear tight clothing around your middle. · Lose weight if you need to. Losing just 5 to 10 pounds can help. · Do not take anti-inflammatory medicines, such as aspirin, ibuprofen (Advil, Motrin), or naproxen (Aleve). These can irritate the stomach. If you need a pain medicine, try acetaminophen (Tylenol), which does not cause stomach upset. When should you call for help? Call 911 anytime you think you may need emergency care. For example, call if: 
· You passed out (lost consciousness). · You vomit blood or what looks like coffee grounds. · You pass maroon or very bloody stools. · You have chest pain or pressure. This may occur with: ¨ Sweating. ¨ Shortness of breath. ¨ Nausea or vomiting. ¨ Pain that spreads from the chest to the neck, jaw, or one or both shoulders or arms. ¨ Feeling dizzy or lightheaded. ¨ A fast or uneven pulse. After calling 911, chew 1 adult-strength aspirin. Wait for an ambulance. Do not try to drive yourself. Call your doctor now or seek immediate medical care if: 
· You have severe belly pain. · Your stools are black and tarlike or have streaks of blood. · You have trouble swallowing. · You are losing weight and do not know why. Watch closely for changes in your health, and be sure to contact your doctor if: 
· You do not get better as expected. Where can you learn more? Go to http://daily-germania.info/. Enter Z713 in the search box to learn more about \"Indigestion (Dyspepsia or Heartburn): Care Instructions. \" Current as of: November 16, 2016 Content Version: 11.2 © 9504-2767 [employer]. Care instructions adapted under license by [employer] (which disclaims liability or warranty for this information). If you have questions about a medical condition or this instruction, always ask your healthcare professional. [address] any warranty or liability for your use of this information. Advise patient to start taking Over The Counter allergy medication (Allegra, Zyrtec, Claritin, Xyzal or Alavert) daily. If you have itchy, watery eyes you can try OTC Zaditor or Zyrtec Allergy Eye drops. If you have a stuffy nose, please try OTC Flonase, Flonase Sensimist, Rhinocort, or Nasacort AQ 2 squirts up each nostril once a day (adults) or 1 squirt up each nostril once a day (children). Use allergy free, dye free, fragrance free products on your body and hair. After being outdoors, brush hair vigorously, wash face and arms, rinse nostrils with a nasal saline spray and consider changing your clothing. Dust furniture frequently and wear a mask while doing it. Vacuum floors weekly. Remove stuffed animals or extra pillows from the bed. Clean bedding in hot water weekly. Sometimes allergies can be so severe that 2 nasal sprays and 2 pills may be needed to control symptoms. Indigestion (Dyspepsia or Heartburn): Care Instructions Your Care Instructions Sometimes it can be hard to pinpoint the cause of indigestion (dyspepsia or heartburn). Most cases of an upset stomach with bloating, burning, burping, and nausea are minor and go away within several hours. Home treatment and over-the-counter medicine often are able to control symptoms. But if you take medicine to relieve your indigestion without making diet and lifestyle changes, your symptoms are likely to return again and again. If you get indigestion often, it may be a sign of a more serious medical problem. Be sure to follow up with your doctor, who may want to do tests to be sure of the cause of your indigestion. Follow-up care is a key part of your treatment and safety. Be sure to make and go to all appointments, and call your doctor if you are having problems. It's also a good idea to know your test results and keep a list of the medicines you take. How can you care for yourself at home? · Your doctor may recommend over-the-counter medicine. For mild or occasional indigestion, antacids such as Tums, Gaviscon, Mylanta, or Maalox may help. Be careful when you take over-the-counter antacid medicines. Many of these medicines have aspirin in them. Read the label to make sure that you are not taking more than the recommended dose. Too much aspirin can be harmful. · Your doctor also may recommend over-the-counter acid reducers, such as Pepcid AC, Tagamet HB, Zantac 75, or Prilosec. Read and follow all instructions on the label. If you use these medicines often, talk with your doctor. · Change your eating habits. ¨ It's best to eat several small meals instead of two or three large meals. ¨ After you eat, wait 2 to 3 hours before you lie down. ¨ Chocolate, mint, and alcohol can make GERD worse. ¨ Spicy foods, foods that have a lot of acid (like tomatoes and oranges), and coffee can make GERD symptoms worse in some people.  If your symptoms are worse after you eat a certain food, you may want to stop eating that food to see if your symptoms get better. · Do not smoke or chew tobacco. Smoking can make GERD worse. If you need help quitting, talk to your doctor about stop-smoking programs and medicines. These can increase your chances of quitting for good. · If you have GERD symptoms at night, raise the head of your bed 6 to 8 inches by putting the frame on blocks or placing a foam wedge under the head of your mattress. (Adding extra pillows does not work.) · Do not wear tight clothing around your middle. · Lose weight if you need to. Losing just 5 to 10 pounds can help. · Do not take anti-inflammatory medicines, such as aspirin, ibuprofen (Advil, Motrin), or naproxen (Aleve). These can irritate the stomach. If you need a pain medicine, try acetaminophen (Tylenol), which does not cause stomach upset. When should you call for help? Call 911 anytime you think you may need emergency care. For example, call if: 
· You passed out (lost consciousness). · You vomit blood or what looks like coffee grounds. · You pass maroon or very bloody stools. · You have chest pain or pressure. This may occur with: ¨ Sweating. ¨ Shortness of breath. ¨ Nausea or vomiting. ¨ Pain that spreads from the chest to the neck, jaw, or one or both shoulders or arms. ¨ Feeling dizzy or lightheaded. ¨ A fast or uneven pulse. After calling 911, chew 1 adult-strength aspirin. Wait for an ambulance. Do not try to drive yourself. Call your doctor now or seek immediate medical care if: 
· You have severe belly pain. · Your stools are black and tarlike or have streaks of blood. · You have trouble swallowing. · You are losing weight and do not know why. Watch closely for changes in your health, and be sure to contact your doctor if: 
· You do not get better as expected. Where can you learn more? Go to http://daily-germania.info/. Enter Y596 in the search box to learn more about \"Indigestion (Dyspepsia or Heartburn): Care Instructions. \" Current as of: November 16, 2016 Content Version: 11.2 © 6009-0823 [employer]. Care instructions adapted under license by [employer] (which disclaims liability or warranty for this information). If you have questions about a medical condition or this instruction, always ask your healthcare professional. [address] any warranty or liability for your use of this information. Introducing [facility]! Dear Vito Arrington: Thank you for requesting a [employer] account. Our records indicate that you already have an active [employer] account. You can access your account anytime at [email or web address] Did you know that you can access your hospital and ER discharge instructions at any time in [employer]? You can also review all of your test results from your hospital stay or ER visit. Additional Information If you have questions, please visit the Frequently Asked Questions section of the [employer] website at [email or web address] Remember, [employer] is NOT to be used for urgent needs. For medical emergencies, dial 911. Now available from your iPhone and Android! Please provide this summary of care documentation to your next provider. Your primary care clinician is listed as Chau Chatterjee. If you have any questions after today's visit, please call [phone].

## 2017-04-26 NOTE — PATIENT INSTRUCTIONS
Seasonal Allergies: Care Instructions  Your Care Instructions  Allergies occur when your body's defense system (immune system) overreacts to certain substances. The immune system treats a harmless substance as if it were a harmful germ or virus. Many things can cause this to happen. Examples include pollens, medicine, food, dust, animal dander, and mold. Your allergies are seasonal if you have symptoms just at certain times of the year. In that case, you are probably allergic to pollens from certain trees, grasses, or weeds. Allergies can be mild or severe. Over-the-counter allergy medicine may help with some symptoms. Read and follow all instructions on the label. Managing your allergies is an important part of staying healthy. Your doctor may suggest that you have tests to help find the cause of your allergies. When you know what things trigger your symptoms, you can avoid them. This can prevent allergy symptoms and other health problems. In some cases, immunotherapy might help. For this treatment, you get shots or use pills that have a small amount of certain allergens in them. Your body \"gets used to\" the allergen, so you react less to it over time. This kind of treatment may help prevent or reduce some allergy symptoms. Follow-up care is a key part of your treatment and safety. Be sure to make and go to all appointments, and call your doctor if you are having problems. It's also a good idea to know your test results and keep a list of the medicines you take. How can you care for yourself at home? · Be safe with medicines. Take your medicines exactly as prescribed. Call your doctor if you think you are having a problem with your medicine. · During your allergy season, keep windows closed. If you need to use air-conditioning, change or clean all filters every month. Take a shower and change your clothes after you have been outside. · Stay inside when pollen counts are high.  Vacuum once or twice a week. Use a vacuum  with a HEPA filter or a double-thickness filter. When should you call for help? Call 911 anytime you think you may need emergency care. For example, call if:  · You have symptoms of a severe allergic reaction. These may include:  ¨ Sudden raised, red areas (hives) all over your body. ¨ Swelling of the throat, mouth, lips, or tongue. ¨ Trouble breathing. ¨ Passing out (losing consciousness). Or you may feel very lightheaded or suddenly feel weak, confused, or restless. Watch closely for changes in your health, and be sure to contact your doctor if:  · You need help controlling your allergies. · You have questions about allergy testing. · You do not get better as expected. Where can you learn more? Go to http://daily-germania.info/. Enter J912 in the search box to learn more about \"Seasonal Allergies: Care Instructions. \"  Current as of: February 12, 2016  Content Version: 11.2  © 3597-8701 [employer]. Care instructions adapted under license by [employer] (which disclaims liability or warranty for this information). If you have questions about a medical condition or this instruction, always ask your healthcare professional. [place] [address] any warranty or liability for your use of this information. Indigestion (Dyspepsia or Heartburn): Care Instructions  Your Care Instructions  Sometimes it can be hard to pinpoint the cause of indigestion (dyspepsia or heartburn). Most cases of an upset stomach with bloating, burning, burping, and nausea are minor and go away within several hours. Home treatment and over-the-counter medicine often are able to control symptoms. But if you take medicine to relieve your indigestion without making diet and lifestyle changes, your symptoms are likely to return again and again. If you get indigestion often, it may be a sign of a more serious medical problem.  Be sure to follow up with your doctor, who may want to do tests to be sure of the cause of your indigestion. Follow-up care is a key part of your treatment and safety. Be sure to make and go to all appointments, and call your doctor if you are having problems. It's also a good idea to know your test results and keep a list of the medicines you take. How can you care for yourself at home? · Your doctor may recommend over-the-counter medicine. For mild or occasional indigestion, antacids such as Tums, Gaviscon, Mylanta, or Maalox may help. Be careful when you take over-the-counter antacid medicines. Many of these medicines have aspirin in them. Read the label to make sure that you are not taking more than the recommended dose. Too much aspirin can be harmful. · Your doctor also may recommend over-the-counter acid reducers, such as Pepcid AC, Tagamet HB, Zantac 75, or Prilosec. Read and follow all instructions on the label. If you use these medicines often, talk with your doctor. · Change your eating habits. ¨ It's best to eat several small meals instead of two or three large meals. ¨ After you eat, wait 2 to 3 hours before you lie down. ¨ Chocolate, mint, and alcohol can make GERD worse. ¨ Spicy foods, foods that have a lot of acid (like tomatoes and oranges), and coffee can make GERD symptoms worse in some people. If your symptoms are worse after you eat a certain food, you may want to stop eating that food to see if your symptoms get better. · Do not smoke or chew tobacco. Smoking can make GERD worse. If you need help quitting, talk to your doctor about stop-smoking programs and medicines. These can increase your chances of quitting for good. · If you have GERD symptoms at night, raise the head of your bed 6 to 8 inches by putting the frame on blocks or placing a foam wedge under the head of your mattress. (Adding extra pillows does not work.)  · Do not wear tight clothing around your middle. · Lose weight if you need to.  Losing just 5 to 10 pounds can help. · Do not take anti-inflammatory medicines, such as aspirin, ibuprofen (Advil, Motrin), or naproxen (Aleve). These can irritate the stomach. If you need a pain medicine, try acetaminophen (Tylenol), which does not cause stomach upset. When should you call for help? Call 911 anytime you think you may need emergency care. For example, call if:  · You passed out (lost consciousness). · You vomit blood or what looks like coffee grounds. · You pass maroon or very bloody stools. · You have chest pain or pressure. This may occur with:  ¨ Sweating. ¨ Shortness of breath. ¨ Nausea or vomiting. ¨ Pain that spreads from the chest to the neck, jaw, or one or both shoulders or arms. ¨ Feeling dizzy or lightheaded. ¨ A fast or uneven pulse. After calling 911, chew 1 adult-strength aspirin. Wait for an ambulance. Do not try to drive yourself. Call your doctor now or seek immediate medical care if:  · You have severe belly pain. · Your stools are black and tarlike or have streaks of blood. · You have trouble swallowing. · You are losing weight and do not know why. Watch closely for changes in your health, and be sure to contact your doctor if:  · You do not get better as expected. Where can you learn more? Go to http://daily-germania.info/. Enter A235 in the search box to learn more about \"Indigestion (Dyspepsia or Heartburn): Care Instructions. \"  Current as of: November 16, 2016  Content Version: 11.2  © 4088-0665 [employer]. Care instructions adapted under license by [employer] (which disclaims liability or warranty for this information). If you have questions about a medical condition or this instruction, always ask your healthcare professional. [place] [address] any warranty or liability for your use of this information. HEARTBURN occurs when stomach acid moves back up through your esophagus.   Several conditions and foods can contribute to this process. Common causes include:    Hiatal Hernia  Pregnancy  Alcohol use  Overweight or Obesity  Smoking. Consuming certain types of foods or drinks can increase the acid content of the stomach and cause heartburn. AVOID THESE TRIGGERS:     Stress  Alcoholic or carbonated beverages  Caffeine (coffee, tea, chocolate, soda)  Acidic foods or drinks (Oranges or orange juice, apples, apple juice, grapefruit or grapefruit juice, bananas, grape juice)  Tomatoes or tomato based foods (pizza, spaghetti, chili)  Greasy, Fatty or Fried foods  Spicy Foods (including hot sauce)  Garlic  Onions  Mint flavoring (peppermint, speramint, cinnamon). PREVENTIVE MEASURES    Do not wear tight, restrictive clothing. Lose weight. Elevate the head of the bed 4 to 6 inches with blocks or a wedge pillow. Wait 2 to 3 hours after a meal before lying down. Avoid the triggers. MEDICATIONS     Try over the counter medications if needed first.  These include:  TUMS, ROLAIDS, Mylanta, Prilosec 20 mg, Pepcid 20 mg, Tagamet, Zantac up to 150 mg twice daily or 300 mg daily, Nexium 20 mg up to 40 mg daily and Prevacid up to 30 mg daily. Ideally, take for 2 weeks after symptoms consistently appear. NOTIFY OUR OFFICE    If symptoms worsen or persist.   If breathing or swallowing becomes difficult. If you regurgitate blood. DIAL 911 IF THE HEART BURN SYMPTOMS ARE ACCOMPANIED BY   Shortness of breath, sweating, pain in the jaw, neck or arm, cold clammy feeling with nausea or vomiting. This could be a HEART ATTACK. Indigestion (Dyspepsia or Heartburn): Care Instructions  Your Care Instructions  Sometimes it can be hard to pinpoint the cause of indigestion (dyspepsia or heartburn). Most cases of an upset stomach with bloating, burning, burping, and nausea are minor and go away within several hours. Home treatment and over-the-counter medicine often are able to control symptoms.  But if you take medicine to relieve your indigestion without making diet and lifestyle changes, your symptoms are likely to return again and again. If you get indigestion often, it may be a sign of a more serious medical problem. Be sure to follow up with your doctor, who may want to do tests to be sure of the cause of your indigestion. Follow-up care is a key part of your treatment and safety. Be sure to make and go to all appointments, and call your doctor if you are having problems. It's also a good idea to know your test results and keep a list of the medicines you take. How can you care for yourself at home? · Your doctor may recommend over-the-counter medicine. For mild or occasional indigestion, antacids such as Tums, Gaviscon, Mylanta, or Maalox may help. Be careful when you take over-the-counter antacid medicines. Many of these medicines have aspirin in them. Read the label to make sure that you are not taking more than the recommended dose. Too much aspirin can be harmful. · Your doctor also may recommend over-the-counter acid reducers, such as Pepcid AC, Tagamet HB, Zantac 75, or Prilosec. Read and follow all instructions on the label. If you use these medicines often, talk with your doctor. · Change your eating habits. ¨ It's best to eat several small meals instead of two or three large meals. ¨ After you eat, wait 2 to 3 hours before you lie down. ¨ Chocolate, mint, and alcohol can make GERD worse. ¨ Spicy foods, foods that have a lot of acid (like tomatoes and oranges), and coffee can make GERD symptoms worse in some people. If your symptoms are worse after you eat a certain food, you may want to stop eating that food to see if your symptoms get better. · Do not smoke or chew tobacco. Smoking can make GERD worse. If you need help quitting, talk to your doctor about stop-smoking programs and medicines. These can increase your chances of quitting for good.   · If you have GERD symptoms at night, raise the head of your bed 6 to 8 inches by putting the frame on blocks or placing a foam wedge under the head of your mattress. (Adding extra pillows does not work.)  · Do not wear tight clothing around your middle. · Lose weight if you need to. Losing just 5 to 10 pounds can help. · Do not take anti-inflammatory medicines, such as aspirin, ibuprofen (Advil, Motrin), or naproxen (Aleve). These can irritate the stomach. If you need a pain medicine, try acetaminophen (Tylenol), which does not cause stomach upset. When should you call for help? Call 911 anytime you think you may need emergency care. For example, call if:  · You passed out (lost consciousness). · You vomit blood or what looks like coffee grounds. · You pass maroon or very bloody stools. · You have chest pain or pressure. This may occur with:  ¨ Sweating. ¨ Shortness of breath. ¨ Nausea or vomiting. ¨ Pain that spreads from the chest to the neck, jaw, or one or both shoulders or arms. ¨ Feeling dizzy or lightheaded. ¨ A fast or uneven pulse. After calling 911, chew 1 adult-strength aspirin. Wait for an ambulance. Do not try to drive yourself. Call your doctor now or seek immediate medical care if:  · You have severe belly pain. · Your stools are black and tarlike or have streaks of blood. · You have trouble swallowing. · You are losing weight and do not know why. Watch closely for changes in your health, and be sure to contact your doctor if:  · You do not get better as expected. Where can you learn more? Go to http://daily-germania.info/. Enter O503 in the search box to learn more about \"Indigestion (Dyspepsia or Heartburn): Care Instructions. \"  Current as of: November 16, 2016  Content Version: 11.2  © 5116-8094 "[employer] Care instructions adapted under license by [employer] (which disclaims liability or warranty for this information).  If you have questions about a medical condition or this instruction, always ask your healthcare professional. [place] [address] any warranty or liability for your use of this information. Indigestion (Dyspepsia or Heartburn): Care Instructions  Your Care Instructions  Sometimes it can be hard to pinpoint the cause of indigestion (dyspepsia or heartburn). Most cases of an upset stomach with bloating, burning, burping, and nausea are minor and go away within several hours. Home treatment and over-the-counter medicine often are able to control symptoms. But if you take medicine to relieve your indigestion without making diet and lifestyle changes, your symptoms are likely to return again and again. If you get indigestion often, it may be a sign of a more serious medical problem. Be sure to follow up with your doctor, who may want to do tests to be sure of the cause of your indigestion. Follow-up care is a key part of your treatment and safety. Be sure to make and go to all appointments, and call your doctor if you are having problems. It's also a good idea to know your test results and keep a list of the medicines you take. How can you care for yourself at home? · Your doctor may recommend over-the-counter medicine. For mild or occasional indigestion, antacids such as Tums, Gaviscon, Mylanta, or Maalox may help. Be careful when you take over-the-counter antacid medicines. Many of these medicines have aspirin in them. Read the label to make sure that you are not taking more than the recommended dose. Too much aspirin can be harmful. · Your doctor also may recommend over-the-counter acid reducers, such as Pepcid AC, Tagamet HB, Zantac 75, or Prilosec. Read and follow all instructions on the label. If you use these medicines often, talk with your doctor. · Change your eating habits. ¨ It's best to eat several small meals instead of two or three large meals. ¨ After you eat, wait 2 to 3 hours before you lie down.   ¨ Chocolate, mint, and alcohol can make GERD worse. ¨ Spicy foods, foods that have a lot of acid (like tomatoes and oranges), and coffee can make GERD symptoms worse in some people. If your symptoms are worse after you eat a certain food, you may want to stop eating that food to see if your symptoms get better. · Do not smoke or chew tobacco. Smoking can make GERD worse. If you need help quitting, talk to your doctor about stop-smoking programs and medicines. These can increase your chances of quitting for good. · If you have GERD symptoms at night, raise the head of your bed 6 to 8 inches by putting the frame on blocks or placing a foam wedge under the head of your mattress. (Adding extra pillows does not work.)  · Do not wear tight clothing around your middle. · Lose weight if you need to. Losing just 5 to 10 pounds can help. · Do not take anti-inflammatory medicines, such as aspirin, ibuprofen (Advil, Motrin), or naproxen (Aleve). These can irritate the stomach. If you need a pain medicine, try acetaminophen (Tylenol), which does not cause stomach upset. When should you call for help? Call 911 anytime you think you may need emergency care. For example, call if:  · You passed out (lost consciousness). · You vomit blood or what looks like coffee grounds. · You pass maroon or very bloody stools. · You have chest pain or pressure. This may occur with:  ¨ Sweating. ¨ Shortness of breath. ¨ Nausea or vomiting. ¨ Pain that spreads from the chest to the neck, jaw, or one or both shoulders or arms. ¨ Feeling dizzy or lightheaded. ¨ A fast or uneven pulse. After calling 911, chew 1 adult-strength aspirin. Wait for an ambulance. Do not try to drive yourself. Call your doctor now or seek immediate medical care if:  · You have severe belly pain. · Your stools are black and tarlike or have streaks of blood. · You have trouble swallowing. · You are losing weight and do not know why.   Watch closely for changes in your health, and be sure to contact your doctor if:  · You do not get better as expected. Where can you learn more? Go to http://daily-germania.info/. Enter J552 in the search box to learn more about \"Indigestion (Dyspepsia or Heartburn): Care Instructions. \"  Current as of: November 16, 2016  Content Version: 11.2  © 5709-0056 [employer]. Care instructions adapted under license by "[employer]" (which disclaims liability or warranty for this information). If you have questions about a medical condition or this instruction, always ask your healthcare professional. [place] [address] any warranty or liability for your use of this information. Advise patient to start taking Over The Counter allergy medication (Allegra, Zyrtec, Claritin, Xyzal or Alavert) daily. If you have itchy, watery eyes you can try OTC Zaditor or Zyrtec Allergy Eye drops. If you have a stuffy nose, please try OTC Flonase, Flonase Sensimist, Rhinocort, or Nasacort AQ 2 squirts up each nostril once a day (adults) or 1 squirt up each nostril once a day (children). Use allergy free, dye free, fragrance free products on your body and hair. After being outdoors, brush hair vigorously, wash face and arms, rinse nostrils with a nasal saline spray and consider changing your clothing. Dust furniture frequently and wear a mask while doing it. Vacuum floors weekly. Remove stuffed animals or extra pillows from the bed. Clean bedding in hot water weekly. Sometimes allergies can be so severe that 2 nasal sprays and 2 pills may be needed to control symptoms. Indigestion (Dyspepsia or Heartburn): Care Instructions  Your Care Instructions  Sometimes it can be hard to pinpoint the cause of indigestion (dyspepsia or heartburn). Most cases of an upset stomach with bloating, burning, burping, and nausea are minor and go away within several hours.  Home treatment and over-the-counter medicine often are able to control symptoms. But if you take medicine to relieve your indigestion without making diet and lifestyle changes, your symptoms are likely to return again and again. If you get indigestion often, it may be a sign of a more serious medical problem. Be sure to follow up with your doctor, who may want to do tests to be sure of the cause of your indigestion. Follow-up care is a key part of your treatment and safety. Be sure to make and go to all appointments, and call your doctor if you are having problems. It's also a good idea to know your test results and keep a list of the medicines you take. How can you care for yourself at home? · Your doctor may recommend over-the-counter medicine. For mild or occasional indigestion, antacids such as Tums, Gaviscon, Mylanta, or Maalox may help. Be careful when you take over-the-counter antacid medicines. Many of these medicines have aspirin in them. Read the label to make sure that you are not taking more than the recommended dose. Too much aspirin can be harmful. · Your doctor also may recommend over-the-counter acid reducers, such as Pepcid AC, Tagamet HB, Zantac 75, or Prilosec. Read and follow all instructions on the label. If you use these medicines often, talk with your doctor. · Change your eating habits. ¨ It's best to eat several small meals instead of two or three large meals. ¨ After you eat, wait 2 to 3 hours before you lie down. ¨ Chocolate, mint, and alcohol can make GERD worse. ¨ Spicy foods, foods that have a lot of acid (like tomatoes and oranges), and coffee can make GERD symptoms worse in some people. If your symptoms are worse after you eat a certain food, you may want to stop eating that food to see if your symptoms get better. · Do not smoke or chew tobacco. Smoking can make GERD worse. If you need help quitting, talk to your doctor about stop-smoking programs and medicines. These can increase your chances of quitting for good.   · If you have GERD symptoms at night, raise the head of your bed 6 to 8 inches by putting the frame on blocks or placing a foam wedge under the head of your mattress. (Adding extra pillows does not work.)  · Do not wear tight clothing around your middle. · Lose weight if you need to. Losing just 5 to 10 pounds can help. · Do not take anti-inflammatory medicines, such as aspirin, ibuprofen (Advil, Motrin), or naproxen (Aleve). These can irritate the stomach. If you need a pain medicine, try acetaminophen (Tylenol), which does not cause stomach upset. When should you call for help? Call 911 anytime you think you may need emergency care. For example, call if:  · You passed out (lost consciousness). · You vomit blood or what looks like coffee grounds. · You pass maroon or very bloody stools. · You have chest pain or pressure. This may occur with:  ¨ Sweating. ¨ Shortness of breath. ¨ Nausea or vomiting. ¨ Pain that spreads from the chest to the neck, jaw, or one or both shoulders or arms. ¨ Feeling dizzy or lightheaded. ¨ A fast or uneven pulse. After calling 911, chew 1 adult-strength aspirin. Wait for an ambulance. Do not try to drive yourself. Call your doctor now or seek immediate medical care if:  · You have severe belly pain. · Your stools are black and tarlike or have streaks of blood. · You have trouble swallowing. · You are losing weight and do not know why. Watch closely for changes in your health, and be sure to contact your doctor if:  · You do not get better as expected. Where can you learn more? Go to http://daily-germania.info/. Enter U209 in the search box to learn more about \"Indigestion (Dyspepsia or Heartburn): Care Instructions. \"  Current as of: November 16, 2016  Content Version: 11.2  © 3588-2686 [employer]. Care instructions adapted under license by [employer] (which disclaims liability or warranty for this information).  If you have questions about a medical condition or this instruction, always ask your healthcare professional. [place] [address] any warranty or liability for your use of this information.

## 2017-04-26 NOTE — PROGRESS NOTES
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 cause stomach upset. When should you call for help? Call 911 anytime you think you may need emergency care. For example, call if:  · You passed out (lost consciousness). · You vomit blood or what looks like coffee grounds. · You pass maroon or very bloody stools. · You have chest pain or pressure. This may occur with:  ¨ Sweating. ¨ Shortness of breath. ¨ Nausea or vomiting. ¨ Pain that spreads from the chest to the neck, jaw, or one or both shoulders or arms. ¨ Feeling dizzy or lightheaded. ¨ A fast or uneven pulse. After calling 911, chew 1 adult-strength aspirin. Wait for an ambulance. Do not try to drive yourself. Call your doctor now or seek immediate medical care if:  · You have severe belly pain. · Your stools are black and tarlike or have streaks of blood. · You have trouble swallowing. · You are losing weight and do not know why.   Watch closely for changes in your health, and be sure to contact your doctor if:  · You do not get better as expected. Where can you learn more? Go to http://daily-germania.info/. Enter L674 in the search box to learn more about \"Indigestion (Dyspepsia or Heartburn): Care Instructions. \"  Current as of: November 16, 2016  Content Version: 11.2  © 5589-2733 [employer]. Care instructions adapted under license by [employer] (which disclaims liability or warranty for this information). If you have questions about a medical condition or this instruction, always ask your healthcare professional. [place] [address] any warranty or liability for your use of this information. HEARTBURN occurs when stomach acid moves back up through your esophagus. Several conditions and foods can contribute to this process. Common causes include:    Hiatal Hernia  Pregnancy  Alcohol use  Overweight or Obesity  Smoking. Consuming certain types of foods or drinks can increase the acid content of the stomach and cause heartburn. AVOID THESE TRIGGERS:     Stress  Alcoholic or carbonated beverages  Caffeine (coffee, tea, chocolate, soda)  Acidic foods or drinks (Oranges or orange juice, apples, apple juice, grapefruit or grapefruit juice, bananas, grape juice)  Tomatoes or tomato based foods (pizza, spaghetti, chili)  Greasy, Fatty or Fried foods  Spicy Foods (including hot sauce)  Garlic  Onions  Mint flavoring (peppermint, speramint, cinnamon). PREVENTIVE MEASURES    Do not wear tight, restrictive clothing. Lose weight. Elevate the head of the bed 4 to 6 inches with blocks or a wedge pillow. Wait 2 to 3 hours after a meal before lying down. Avoid the triggers. MEDICATIONS     Try over the counter medications if needed first.  These include:  TUMS, ROLAIDS, Mylanta, Prilosec 20 mg, Pepcid 20 mg, Tagamet, Zantac up to 150 mg twice daily or 300 mg daily, Nexium 20 mg up to 40 mg daily and Prevacid up to 30 mg daily. Ideally, take for 2 weeks after symptoms consistently appear. NOTIFY OUR OFFICE    If symptoms worsen or persist.   If breathing or swallowing becomes difficult. If you regurgitate blood. DIAL 911 IF THE HEART BURN SYMPTOMS ARE ACCOMPANIED BY   Shortness of breath, sweating, pain in the jaw, neck or arm, cold clammy feeling with nausea or vomiting. This could be a HEART ATTACK. Indigestion (Dyspepsia or Heartburn): Care Instructions  Your Care Instructions  Sometimes it can be hard to pinpoint the cause of indigestion (dyspepsia or heartburn). Most cases of an upset stomach with bloating, burning, burping, and nausea are minor and go away within several hours. Home treatment and over-the-counter medicine often are able to control symptoms. But if you take medicine to relieve your indigestion without making diet and lifestyle changes, your symptoms are likely to return again and again. If you get indigestion often, it may be a sign of a more serious medical problem. Be sure to follow up with your doctor, who may want to do tests to be sure of the cause of your indigestion. Follow-up care is a key part of your treatment and safety. Be sure to make and go to all appointments, and call your doctor if you are having problems. It's also a good idea to know your test results and keep a list of the medicines you take. How can you care for yourself at home? · Your doctor may recommend over-the-counter medicine. For mild or occasional indigestion, antacids such as Tums, Gaviscon, Mylanta, or Maalox may help. Be careful when you take over-the-counter antacid medicines. Many of these medicines have aspirin in them. Read the label to make sure that you are not taking more than the recommended dose. Too much aspirin can be harmful. · Your doctor also may recommend over-the-counter acid reducers, such as Pepcid AC, Tagamet HB, Zantac 75, or Prilosec. Read and follow all instructions on the label.  If you use these medicines often, talk with your doctor. · Change your eating habits. ¨ It's best to eat several small meals instead of two or three large meals. ¨ After you eat, wait 2 to 3 hours before you lie down. ¨ Chocolate, mint, and alcohol can make GERD worse. ¨ Spicy foods, foods that have a lot of acid (like tomatoes and oranges), and coffee can make GERD symptoms worse in some people. If your symptoms are worse after you eat a certain food, you may want to stop eating that food to see if your symptoms get better. · Do not smoke or chew tobacco. Smoking can make GERD worse. If you need help quitting, talk to your doctor about stop-smoking programs and medicines. These can increase your chances of quitting for good. · If you have GERD symptoms at night, raise the head of your bed 6 to 8 inches by putting the frame on blocks or placing a foam wedge under the head of your mattress. (Adding extra pillows does not work.)  · Do not wear tight clothing around your middle. · Lose weight if you need to. Losing just 5 to 10 pounds can help. · Do not take anti-inflammatory medicines, such as aspirin, ibuprofen (Advil, Motrin), or naproxen (Aleve). These can irritate the stomach. If you need a pain medicine, try acetaminophen (Tylenol), which does not cause stomach upset. When should you call for help? Call 911 anytime you think you may need emergency care. For example, call if:  · You passed out (lost consciousness). · You vomit blood or what looks like coffee grounds. · You pass maroon or very bloody stools. · You have chest pain or pressure. This may occur with:  ¨ Sweating. ¨ Shortness of breath. ¨ Nausea or vomiting. ¨ Pain that spreads from the chest to the neck, jaw, or one or both shoulders or arms. ¨ Feeling dizzy or lightheaded. ¨ A fast or uneven pulse. After calling 911, chew 1 adult-strength aspirin. Wait for an ambulance. Do not try to drive yourself.   Call your doctor now or seek immediate medical care if:  · You have severe belly pain. · Your stools are black and tarlike or have streaks of blood. · You have trouble swallowing. · You are losing weight and do not know why. Watch closely for changes in your health, and be sure to contact your doctor if:  · You do not get better as expected. Where can you learn more? Go to http://daily-germania.info/. Enter C414 in the search box to learn more about \"Indigestion (Dyspepsia or Heartburn): Care Instructions. \"  Current as of: November 16, 2016  Content Version: 11.2  © 3515-5878 [employer]. Care instructions adapted under license by [employer] (which disclaims liability or warranty for this information). If you have questions about a medical condition or this instruction, always ask your healthcare professional. [address] any warranty or liability for your use of this information. Indigestion (Dyspepsia or Heartburn): Care Instructions  Your Care Instructions  Sometimes it can be hard to pinpoint the cause of indigestion (dyspepsia or heartburn). Most cases of an upset stomach with bloating, burning, burping, and nausea are minor and go away within several hours. Home treatment and over-the-counter medicine often are able to control symptoms. But if you take medicine to relieve your indigestion without making diet and lifestyle changes, your symptoms are likely to return again and again. If you get indigestion often, it may be a sign of a more serious medical problem. Be sure to follow up with your doctor, who may want to do tests to be sure of the cause of your indigestion. Follow-up care is a key part of your treatment and safety. Be sure to make and go to all appointments, and call your doctor if you are having problems. It's also a good idea to know your test results and keep a list of the medicines you take. How can you care for yourself at home?   · Your doctor may recommend over-the-counter medicine. For mild or occasional indigestion, antacids such as Tums, Gaviscon, Mylanta, or Maalox may help. Be careful when you take over-the-counter antacid medicines. Many of these medicines have aspirin in them. Read the label to make sure that you are not taking more than the recommended dose. Too much aspirin can be harmful. · Your doctor also may recommend over-the-counter acid reducers, such as Pepcid AC, Tagamet HB, Zantac 75, or Prilosec. Read and follow all instructions on the label. If you use these medicines often, talk with your doctor. · Change your eating habits. ¨ It's best to eat several small meals instead of two or three large meals. ¨ After you eat, wait 2 to 3 hours before you lie down. ¨ Chocolate, mint, and alcohol can make GERD worse. ¨ Spicy foods, foods that have a lot of acid (like tomatoes and oranges), and coffee can make GERD symptoms worse in some people. If your symptoms are worse after you eat a certain food, you may want to stop eating that food to see if your symptoms get better. · Do not smoke or chew tobacco. Smoking can make GERD worse. If you need help quitting, talk to your doctor about stop-smoking programs and medicines. These can increase your chances of quitting for good. · If you have GERD symptoms at night, raise the head of your bed 6 to 8 inches by putting the frame on blocks or placing a foam wedge under the head of your mattress. (Adding extra pillows does not work.)  · Do not wear tight clothing around your middle. · Lose weight if you need to. Losing just 5 to 10 pounds can help. · Do not take anti-inflammatory medicines, such as aspirin, ibuprofen (Advil, Motrin), or naproxen (Aleve). These can irritate the stomach. If you need a pain medicine, try acetaminophen (Tylenol), which does not cause stomach upset. When should you call for help? Call 911 anytime you think you may need emergency care.  For example, call if:  · You passed out (lost consciousness). · You vomit blood or what looks like coffee grounds. · You pass maroon or very bloody stools. · You have chest pain or pressure. This may occur with:  ¨ Sweating. ¨ Shortness of breath. ¨ Nausea or vomiting. ¨ Pain that spreads from the chest to the neck, jaw, or one or both shoulders or arms. ¨ Feeling dizzy or lightheaded. ¨ A fast or uneven pulse. After calling 911, chew 1 adult-strength aspirin. Wait for an ambulance. Do not try to drive yourself. Call your doctor now or seek immediate medical care if:  · You have severe belly pain. · Your stools are black and tarlike or have streaks of blood. · You have trouble swallowing. · You are losing weight and do not know why. Watch closely for changes in your health, and be sure to contact your doctor if:  · You do not get better as expected. Where can you learn more? Go to http://daily-germania.info/. Enter Y109 in the search box to learn more about \"Indigestion (Dyspepsia or Heartburn): Care Instructions. \"  Current as of: November 16, 2016  Content Version: 11.2  © 9779-2067 [employer]. Care instructions adapted under license by [employer] (which disclaims liability or warranty for this information). If you have questions about a medical condition or this instruction, always ask your healthcare professional. [place] [address] any warranty or liability for your use of this information. Advise patient to start taking Over The Counter allergy medication (Allegra, Zyrtec, Claritin, Xyzal or Alavert) daily. If you have itchy, watery eyes you can try OTC Zaditor or Zyrtec Allergy Eye drops. If you have a stuffy nose, please try OTC Flonase, Flonase Sensimist, Rhinocort, or Nasacort AQ 2 squirts up each nostril once a day (adults) or 1 squirt up each nostril once a day (children). Use allergy free, dye free, fragrance free products on your body and hair. After being outdoors, brush hair vigorously, wash face and arms, rinse nostrils with a nasal saline spray and consider changing your clothing. Dust furniture frequently and wear a mask while doing it. Vacuum floors weekly. Remove stuffed animals or extra pillows from the bed. Clean bedding in hot water weekly. Sometimes allergies can be so severe that 2 nasal sprays and 2 pills may be needed to control symptoms. Indigestion (Dyspepsia or Heartburn): Care Instructions  Your Care Instructions  Sometimes it can be hard to pinpoint the cause of indigestion (dyspepsia or heartburn). Most cases of an upset stomach with bloating, burning, burping, and nausea are minor and go away within several hours. Home treatment and over-the-counter medicine often are able to control symptoms. But if you take medicine to relieve your indigestion without making diet and lifestyle changes, your symptoms are likely to return again and again. If you get indigestion often, it may be a sign of a more serious medical problem. Be sure to follow up with your doctor, who may want to do tests to be sure of the cause of your indigestion. Follow-up care is a key part of your treatment and safety. Be sure to make and go to all appointments, and call your doctor if you are having problems. It's also a good idea to know your test results and keep a list of the medicines you take. How can you care for yourself at home? · Your doctor may recommend over-the-counter medicine. For mild or occasional indigestion, antacids such as Tums, Gaviscon, Mylanta, or Maalox may help. Be careful when you take over-the-counter antacid medicines. Many of these medicines have aspirin in them. Read the label to make sure that you are not taking more than the recommended dose. Too much aspirin can be harmful. · Your doctor also may recommend over-the-counter acid reducers, such as Pepcid AC, Tagamet HB, Zantac 75, or Prilosec.  Read and follow all instructions on the label. If you use these medicines often, talk with your doctor. · Change your eating habits. ¨ It's best to eat several small meals instead of two or three large meals. ¨ After you eat, wait 2 to 3 hours before you lie down. ¨ Chocolate, mint, and alcohol can make GERD worse. ¨ Spicy foods, foods that have a lot of acid (like tomatoes and oranges), and coffee can make GERD symptoms worse in some people. If your symptoms are worse after you eat a certain food, you may want to stop eating that food to see if your symptoms get better. · Do not smoke or chew tobacco. Smoking can make GERD worse. If you need help quitting, talk to your doctor about stop-smoking programs and medicines. These can increase your chances of quitting for good. · If you have GERD symptoms at night, raise the head of your bed 6 to 8 inches by putting the frame on blocks or placing a foam wedge under the head of your mattress. (Adding extra pillows does not work.)  · Do not wear tight clothing around your middle. · Lose weight if you need to. Losing just 5 to 10 pounds can help. · Do not take anti-inflammatory medicines, such as aspirin, ibuprofen (Advil, Motrin), or naproxen (Aleve). These can irritate the stomach. If you need a pain medicine, try acetaminophen (Tylenol), which does not cause stomach upset. When should you call for help? Call 911 anytime you think you may need emergency care. For example, call if:  · You passed out (lost consciousness). · You vomit blood or what looks like coffee grounds. · You pass maroon or very bloody stools. · You have chest pain or pressure. This may occur with:  ¨ Sweating. ¨ Shortness of breath. ¨ Nausea or vomiting. ¨ Pain that spreads from the chest to the neck, jaw, or one or both shoulders or arms. ¨ Feeling dizzy or lightheaded. ¨ A fast or uneven pulse. After calling 911, chew 1 adult-strength aspirin. Wait for an ambulance. Do not try to drive yourself.   Call your doctor now or seek immediate medical care if:  · You have severe belly pain. · Your stools are black and tarlike or have streaks of blood. · You have trouble swallowing. · You are losing weight and do not know why. Watch closely for changes in your health, and be sure to contact your doctor if:  · You do not get better as expected. Where can you learn more? Go to http://daily-germania.info/. Enter B690 in the search box to learn more about \"Indigestion (Dyspepsia or Heartburn): Care Instructions. \"  Current as of: November 16, 2016  Content Version: 11.2  © 6378-9329 [employer]. Care instructions adapted under license by [employer] (which disclaims liability or warranty for this information). If you have questions about a medical condition or this instruction, always ask your healthcare professional. [place] [address] any warranty or liability for your use of this information.

## 2017-04-26 NOTE — PROGRESS NOTES
Chief Complaint   Patient presents with    Referral Follow Up     sports medicine/orthopedics and opthamologist    Ear Drainage     feels like her ears on draining and her sinuses are congested    Chest Pain     poc EKG performed; face down outside of pt's room     1. Have you been to the ER, urgent care clinic since your last visit? Hospitalized since your last visit? Yes, Pt went to [facility] on 02/25/2017 for dx of epigastric ABD pain, peptic ulcer, and left-side throacic back pain. 2. Have you seen or consulted any other health care providers outside of the [address] [place] [address] since your last visit? Include any pap smears or colon screening. Yes, Pt saw an opthamologist at [facility]; Dr. Venkatesh Armstrong was booked. In the event something were to happen to you and you were unable to speak on your behalf, do you have an Advance Directive/ Living Will in place stating your wishes? NO    If yes, do we have a copy on file NO    If no, would you like information:    Pt offered and declined. Pt stated that she did see an Opthamologist at [facility], Dr. Venkatesh Armstrong was booked. Pt did not go see sports medicine or orthopedics MD, stated that it stopped bothering her once she started working out. Pt stated that she still needs to get her labs done from January ov, stated that when they cancelled her previous appointment, they also were cancelling her lab appointment as well and has not made another one as of today. Pt stated that she is having chest pain; stated that she went to the ER for it and her cardiologist, and was told that it could be her stomach ulcers or her hernia. Pt stated that it is more of an achy, tightness, like she has chest congestion, more than pain.

## 2017-04-28 RX ORDER — CIPROFLOXACIN 500 MG/1
500 TABLET ORAL 2 TIMES DAILY
Qty: 20 TAB | Refills: 0
Start: 2017-04-28 | End: 2017-05-08

## 2017-05-02 ENCOUNTER — OFFICE VISIT (OUTPATIENT)
Dept: BARIATRICS/WEIGHT MGMT | Age: 47
End: 2017-05-02

## 2017-05-02 DIAGNOSIS — E66.9 OBESITY, UNSPECIFIED: Primary | ICD-10-CM

## 2017-05-10 ENCOUNTER — OFFICE VISIT (OUTPATIENT)
Dept: FAMILY MEDICINE CLINIC | Age: 47
End: 2017-05-10

## 2017-05-10 VITALS
WEIGHT: 255 LBS | HEART RATE: 68 BPM | BODY MASS INDEX: 40.98 KG/M2 | RESPIRATION RATE: 18 BRPM | OXYGEN SATURATION: 97 % | HEIGHT: 66 IN | SYSTOLIC BLOOD PRESSURE: 118 MMHG | TEMPERATURE: 98.1 F | DIASTOLIC BLOOD PRESSURE: 77 MMHG

## 2017-05-10 DIAGNOSIS — Z82.3 FAMILY HISTORY OF STROKE: ICD-10-CM

## 2017-05-10 DIAGNOSIS — I25.10 ATHEROSCLEROSIS OF NATIVE CORONARY ARTERY OF NATIVE HEART WITHOUT ANGINA PECTORIS: ICD-10-CM

## 2017-05-10 DIAGNOSIS — Z83.49 FAMILY HISTORY OF OBESITY: ICD-10-CM

## 2017-05-10 DIAGNOSIS — Z82.61 FAMILY HISTORY OF RHEUMATOID ARTHRITIS: ICD-10-CM

## 2017-05-10 DIAGNOSIS — K58.1 IRRITABLE BOWEL SYNDROME WITH CONSTIPATION: ICD-10-CM

## 2017-05-10 DIAGNOSIS — Z82.69 FAMILY HISTORY OF SYSTEMIC LUPUS ERYTHEMATOSUS: ICD-10-CM

## 2017-05-10 DIAGNOSIS — Z95.5 S/P CORONARY ARTERY STENT PLACEMENT: ICD-10-CM

## 2017-05-10 DIAGNOSIS — R00.2 HEART PALPITATIONS: ICD-10-CM

## 2017-05-10 DIAGNOSIS — E66.01 OBESITY, CLASS III, BMI 40-49.9 (MORBID OBESITY) (HCC): Primary | ICD-10-CM

## 2017-05-10 DIAGNOSIS — Z82.49 FAMILY HISTORY OF HEART ATTACK: ICD-10-CM

## 2017-05-10 DIAGNOSIS — I83.90 VARICOSE VEIN OF LEG: ICD-10-CM

## 2017-05-10 DIAGNOSIS — Z80.0 FAMILY HISTORY OF PANCREATIC CANCER: ICD-10-CM

## 2017-05-10 DIAGNOSIS — I10 ESSENTIAL HYPERTENSION: ICD-10-CM

## 2017-05-10 DIAGNOSIS — L70.0 ACNE VULGARIS: ICD-10-CM

## 2017-05-10 DIAGNOSIS — E78.5 HYPERLIPIDEMIA WITH TARGET LDL LESS THAN 70: ICD-10-CM

## 2017-05-10 DIAGNOSIS — K27.9 PUD (PEPTIC ULCER DISEASE): ICD-10-CM

## 2017-05-10 DIAGNOSIS — Z80.3 FAMILY HISTORY OF BREAST CANCER: ICD-10-CM

## 2017-05-10 NOTE — MR AVS SNAPSHOT
Visit Information Date & Time Provider Department Dept. Phone Encounter #  
 5/10/2017  9:00 AM Ramón Garcia DO [facility] [id number] Follow-up Instructions Return in about 1 month (around 6/10/2017) for MSWLP, results . Your Appointments 5/25/2017  2:00 PM  
New Patient with Mary Williamson MD  
[id number] [facility] ([facility]) Appt Note: NP_ ref by Dr Rosie Rivero vivid dreaming, wakes feeling sleepy_ [address] [address] [facility]  
  
   
 [address] [facility] [address] [facility] [address] Upcoming Health Maintenance Date Due  
 PAP AKA CERVICAL CYTOLOGY 6/16/2017* INFLUENZA AGE 9 TO ADULT 8/1/2017 DTaP/Tdap/Td series (2 - Td) 1/31/2027 *Topic was postponed. The date shown is not the original due date. Allergies as of 5/10/2017  Review Complete On: 5/10/2017 By: Ramón Garcia DO Severity Noted Reaction Type Reactions Amitiza [Lubiprostone]  10/23/2009    Nausea Only Lipitor [Atorvastatin]  01/08/2014    Myalgia Memory disturbance Nsaids (Non-steroidal Anti-inflammatory Drug)  04/26/2017    Other (comments) AVOID DUE TO SEVERE PUD Sucralose  05/10/2017    Anxiety Current Immunizations  Reviewed on 4/26/2017 Name Date Td 4/1/2013 Not reviewed this visit You Were Diagnosed With   
  
 Codes Comments Obesity, Class III, BMI 40-49.9 (morbid obesity) ([facility] [address]    -  Primary ICD-10-CM: E66.01 
ICD-9-CM: 278.01 Atherosclerosis of native coronary artery of native heart without angina pectoris     ICD-10-CM: I25.10 ICD-9-CM: 414.01 Essential hypertension     ICD-10-CM: I10 
ICD-9-CM: 401.9 stable Heart palpitations     ICD-10-CM: R00.2 ICD-9-CM: 785.1 PUD (peptic ulcer disease)     ICD-10-CM: K27.9 ICD-9-CM: 533.90 Family history of obesity     ICD-10-CM: Z83.49 
ICD-9-CM: V18.19 Family history of heart attack     ICD-10-CM: Z82.49 
ICD-9-CM: V17.3 Family history of rheumatoid arthritis     ICD-10-CM: Z82.61 ICD-9-CM: V17.7 Family history of systemic lupus erythematosus     ICD-10-CM: Z82.69 ICD-9-CM: V19.4 Family history of breast cancer     ICD-10-CM: Z80.3 ICD-9-CM: V16.3 Family history of stroke     ICD-10-CM: Z82.3 ICD-9-CM: V17.1 Family history of pancreatic cancer     ICD-10-CM: Z80.0 ICD-9-CM: V16.0 Hyperlipidemia with target LDL less than 70     ICD-10-CM: E78.5 ICD-9-CM: 272.4 S/P coronary artery stent placement     ICD-10-CM: Z95.5 ICD-9-CM: V45.82 Acne vulgaris     ICD-10-CM: L70.0 ICD-9-CM: 706.1 Irritable bowel syndrome with constipation     ICD-10-CM: K58.1 ICD-9-CM: 577.0 Varicose vein of leg     ICD-10-CM: I83.90 ICD-9-CM: 454.9 Vitals BP Pulse Temp Resp Height(growth percentile) Weight(growth percentile) 118/77 (BP 1 Location: Right arm, BP Patient Position: Sitting) 68 98.1 °F (36.7 °C) (Oral) 18 5' 5.75\" (1.67 m) 255 lb (115.7 kg) LMP SpO2 BMI OB Status Smoking Status 02/10/2017 (Approximate) 97% 41.47 kg/m2 Having regular periods Never Smoker BMI and BSA Data Body Mass Index Body Surface Area  
 41.47 kg/m 2 2.32 m 2 Preferred Pharmacy Pharmacy Name Phone [id number] [address] [phone] Your Updated Medication List  
  
   
This list is accurate as of: 5/10/17  9:35 AM.  Always use your most recent med list.  
  
  
  
  
 albuterol 90 mcg/actuation inhaler Commonly known as:  PROVENTIL HFA, VENTOLIN HFA, PROAIR HFA Take 2 Puffs by inhalation every four (4) hours as needed for Wheezing. Indications: BRONCHOSPASM PREVENTION  
  
 aspirin delayed-release 81 mg tablet Take 1 Tab by mouth daily. CRESTOR 20 mg tablet Generic drug:  rosuvastatin TAKE 1 TABLET BY MOUTH AT BEDTIME  
  
 esomeprazole 40 mg capsule Commonly known as:  NEXIUM  
TAKE ONE CAPSULE BY MOUTH DAILY 1/2 HR BEFORE BREAKFAST  
  
 fluticasone 50 mcg/actuation nasal spray Commonly known as:  Dann Adams INSTILL 1 SPRAY IN EACH NOSTRIL TWICE DAILY LINZESS 290 mcg Cap capsule Generic drug:  linaclotide  
as needed. D. Manetas  
  
 metoprolol tartrate 25 mg tablet Commonly known as:  LOPRESSOR  
TAKE 1/2 TABLET BY MOUTH TWICE A DAY  
  
 montelukast 10 mg tablet Commonly known as:  SINGULAIR Take 1 Tab by mouth daily. Indications: ALLERGIC RHINITIS  
  
 omega-3 fatty acids-vitamin e 1,000 mg Cap Commonly known as:  FISH OIL Take 1 Cap by mouth daily. Take 1 tablet daily x 2 weeks. Increase to 2 tablets daily PHAZYME 180 mg Cap Generic drug:  simethicone Take  by mouth.  
  
 predniSONE 10 mg tablet Commonly known as:  DELTASONE  
  
 PROTONIX 40 mg tablet Generic drug:  pantoprazole Take 40 mg by mouth daily. sucralfate 100 mg/mL suspension Commonly known as:  Treva Braver Take  by mouth four (4) times daily. ZANTAC 150 mg tablet Generic drug:  raNITIdine Take 150 mg by mouth two (2) times a day. Follow-up Instructions Return in about 1 month (around 6/10/2017) for MSWLP, results . Patient Instructions Start OTC Vitamin B12 1,000 mcg daily and OTC Fish Oil with EPA and -1,000 mg daily. Recommend OTC Magnesium 400 mg daily prn constipation and muscle spasm. Introducing [facility]! Dear Patrica Barnett: Thank you for requesting a [employer] account. Our records indicate that you already have an active [employer] account. You can access your account anytime at [email or web address] Did you know that you can access your hospital and ER discharge instructions at any time in [employer]? You can also review all of your test results from your hospital stay or ER visit. Additional Information If you have questions, please visit the Frequently Asked Questions section of the [employer] website at [email or web address] Remember, [employer] is NOT to be used for urgent needs. For medical emergencies, dial 911. Now available from your iPhone and Android! Please provide this summary of care documentation to your next provider. Your primary care clinician is listed as [facility]. If you have any questions after today's visit, please call [phone].

## 2017-05-10 NOTE — PROGRESS NOTES
Chief Complaint   Patient presents with    Weight Management     [facility] initial visit     1. Have you been to the ER, urgent care clinic since your last visit? Hospitalized since your last visit? No    2. Have you seen or consulted any other health care providers outside of the [address] [place] [address] since your last visit? Include any pap smears or colon screening. No    In the event something were to happen to you and you were unable to speak on your behalf, do you have an Advance Directive/ Living Will in place stating your wishes? NO    If yes, do we have a copy on file NO    If no, would you like information:   Pt offered and declined. Body Weight: 255.0lb  Body Fat: 44.2%  Muscle Mass: 33.8%  Body H2O: 42.3%  BMR: 1786  BMI: 42.1%    EKG done on 04/26/2017  No sleep studies noted and Dx'd with Narcolepsy in 2005.

## 2017-05-10 NOTE — PATIENT INSTRUCTIONS
Start OTC Vitamin B12 1,000 mcg daily and OTC Fish Oil with EPA and -1,000 mg daily. Recommend OTC Magnesium 400 mg daily prn constipation and muscle spasm.
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
## 2017-05-16 LAB
% ALBUMIN, 58A: 54 % (ref 54–71)
25(OH)D3 SERPL-MCNC: 31 NG/ML (ref 30–100)
ABSOLUTE IMMATURE GRANULOCYTES, AIG: 0 X10^3/UL (ref 0–0.1)
ADIPONECTIN: 12 UG/ML
ALB/GLOBRATIO, 58C: 1.16 (ref 1.15–2.5)
ALBUMIN SERPL-MCNC: 3.5 G/DL (ref 3.7–5.1)
ALP SERPL-CCNC: 81 U/L (ref 35–125)
ALT SERPL-CCNC: 11 U/L
ANION GAP SERPL CALC-SCNC: 9 MMOL/L (ref 6–18)
APOLIPOPROTEIN B , 48: 65 MG/DL
AST SERPL W P-5'-P-CCNC: 17 U/L (ref 5–32)
BASOPHILS # BLD: 1 % (ref 0–3)
BASOPHILS NFR BLD: 0.1 X10^3/UL (ref 0–0.2)
BILIRUB SERPL-MCNC: 0.3 MG/DL
BUN SERPL-MCNC: 11 MG/DL (ref 6–20)
BUN/CREATININE RATIO,BUCR: 15 (ref 10–27)
CALCIUM SERPL-MCNC: 8.9 MG/DL (ref 8.8–10.5)
CHLORIDE SERPL-SCNC: 104 MMOL/L (ref 98–110)
CHOLEST SERPL-MCNC: 121 MG/DL
CO2 SERPL-SCNC: 27 MMOL/L (ref 19–31)
CREAT SERPL-MCNC: 0.7 MG/DL (ref 0.5–0.9)
CRP SERPL HS-MCNC: 8.8 MG/L
EOSINOPHIL # BLD: 5 % (ref 0–7)
EOSINOPHIL NFR BLD: 0.3 X10^3/UL (ref 0–0.4)
ERYTHROCYTE [DISTWIDTH] IN BLOOD BY AUTOMATED COUNT: 13.7 % (ref 11.7–15)
ESTIMATED AVERAGE GLUCOSE, EAG: 142.7 MG/DL
FIBRINOGEN ANTIGEN, 117051: 578 MG/DL (ref 126–437)
GLOBCALC, 58B: 3 G/DL (ref 1.9–3.5)
GLUCOSE SERPL-MCNC: 91 MG/DL (ref 70–99)
GRANULOCYTES,GRANS: 49 % (ref 40–74)
HCT VFR BLD AUTO: 40 % (ref 34–44)
HDLC SERPL-MCNC: 39 MG/DL
HGB BLD-MCNC: 12.7 G/DL (ref 11.5–15)
HGBA1C-T, HDL6300: 6.6 %
INSULIN,INS: 7 UU/ML (ref 3–9)
LDLC SERPL CALC-MCNC: 77 MG/DL
LP-PLA2, 123241: 35 NG/ML
LYMPHOCYTES # BLD: 2.5 X10^3/UL (ref 0.7–4.5)
LYMPHOCYTES NFR BLD: 38 % (ref 14–46)
MAGNESIUM SERPL-MCNC: 2 MG/DL (ref 1.6–2.4)
MCH RBC QN AUTO: 28 PG (ref 27–34)
MCHC RBC AUTO-ENTMCNC: 32 G/DL (ref 32–36)
MCV RBC AUTO: 88 FL (ref 80–98)
MONOCYTES # BLD: 0.4 X10^3/UL (ref 0.1–1)
MONOCYTES NFR BLD: 7 % (ref 4–13)
MPOAU: 359 PMOL/L
NEUTROPHILS # BLD AUTO: 3.3 X10^3/UL (ref 1.8–7.8)
NON-HDL CHOLESTEROL, 011976: 83 MG/DL
PLATELET # BLD AUTO: 260 X10^3/UL (ref 140–415)
POTASSIUM SERPL-SCNC: 4.6 MMOL/L (ref 3.5–5.3)
PROT SERPL-MCNC: 6.5 G/DL (ref 6.1–8)
RBC # BLD AUTO: 4.5 X10^6/UL (ref 3.8–5.1)
SMALL DENSE LDL, 47: 15 MG/DL
SODIUM SERPL-SCNC: 140 MMOL/L (ref 133–145)
TRIGL SERPL-MCNC: 78 MG/DL (ref ?–150)
TSH SERPL-ACNC: 1.68 UIU/ML (ref 0.27–4.2)
URATE SERPL-MCNC: 4.7 MG/DL (ref 2–6.9)
WBC # BLD AUTO: 6.6 X10^3/UL (ref 4–10.5)

## 2017-05-17 LAB
CAMPESTEROL RATIO, HDL1303: 196 (ref 115–240)
CAMPESTEROL, HDL1302: 2.46 UG/ML (ref 2.11–4.43)
CHOLESTANOL RATIO, HDL1305: 136 (ref 117–194)
CHOLESTANOL, HDL1304: 1.65 UG/ML (ref 2.02–3.47)
DESMOSTEROL RATIO, HDL1307: ABNORMAL (ref 31–64)
DESMOSTEROL, HDL1306: < 0.5 UG/ML
HDL HDL-P, HDL5001: 28 UMOL/L
HDL LDL-P, HDL5000: 889 NMOL/L
HDL SLDL-P, HDL5002: 604 NMOL/L
LEPTIN, SERUM, 146711: 108 NG/ML
LP(A)-P, HDL4000: 309 NMOL/L
SITOSTEROL RATIO, HDL1301: 116 (ref 76–168)
SITOSTEROL, HDL1300: 1.5 UG/ML (ref 1.43–3.17)

## 2017-05-18 LAB
ALPHA LINOLEIC ACID N3, HDL1202: 0.11 % (ref 0.1–0.4)
CIS-MONO-UNSATURATED FATTY ACID TOTAL, HDL1205: 14.5 (ref 11.5–20.5)
DOCOSAPENTAENOIC ACID N3, HDL1206: 2.13 % (ref 0.6–4.1)
DOCOSAPENTAENOIC ACID N6, HDL1207: 0.76 % (ref 0.1–1.3)
OMEGA-3 FATTY ACID TOTAL, HDL1220: 7.3 (ref 0.1–14.1)
OMEGA-3 INDEX, HDL1219: 5 (ref 0.1–10.4)
OMEGA-6 FATTY ACID TOTAL, HDL1222: 36.7 (ref 28.6–44.5)
SATURATED FATTY ACID TOTAL, HDL1226: 40.8 (ref 36.6–42)
TRANS FATTY ACID TOTAL, HDL1232: 0.7 (ref 0.1–1.8)
TRANSLINOLEIC ACID, HDL1229: 0.1 % (ref 0.1–0.5)
TRANSOLEIC ACID, HDL1230: 0.48 % (ref 0.1–1.3)

## 2017-06-14 DIAGNOSIS — H10.13 ALLERGIC CONJUNCTIVITIS AND RHINITIS, BILATERAL: ICD-10-CM

## 2017-06-14 DIAGNOSIS — J30.9 ALLERGIC CONJUNCTIVITIS AND RHINITIS, BILATERAL: ICD-10-CM

## 2017-06-14 NOTE — TELEPHONE ENCOUNTER
This was done in April for 30+11--pharmacy requested 90 day supply via fax. Fax returned to them with the ok for 90 +2.   This was marked No Print since we sent back the fax

## 2017-06-16 RX ORDER — MONTELUKAST SODIUM 10 MG/1
10 TABLET ORAL DAILY
Qty: 90 TAB | Refills: 2
Start: 2017-06-16 | End: 2018-04-03 | Stop reason: ALTCHOICE

## 2017-09-12 NOTE — TELEPHONE ENCOUNTER
Just filled for 1+5 but she can get 90 day (3) +1 per fax request from the pharmacy.  Form returned to them to ok the switch

## 2017-09-13 RX ORDER — FLUTICASONE PROPIONATE 50 MCG
SPRAY, SUSPENSION (ML) NASAL
Qty: 3 BOTTLE | Refills: 1
Start: 2017-09-13

## 2017-12-05 RX ORDER — METOPROLOL TARTRATE 25 MG/1
TABLET, FILM COATED ORAL
Qty: 90 TAB | Refills: 0 | Status: SHIPPED | OUTPATIENT
Start: 2017-12-05 | End: 2018-03-07 | Stop reason: SDUPTHER

## 2017-12-08 DIAGNOSIS — J30.89 ENVIRONMENTAL AND SEASONAL ALLERGIES: Primary | ICD-10-CM

## 2017-12-08 RX ORDER — FLUTICASONE PROPIONATE 50 MCG
SPRAY, SUSPENSION (ML) NASAL
Qty: 3 BOTTLE | Refills: 1 | OUTPATIENT
Start: 2017-12-08

## 2018-03-07 RX ORDER — METOPROLOL TARTRATE 25 MG/1
TABLET, FILM COATED ORAL
Qty: 90 TAB | Refills: 0 | Status: SHIPPED | OUTPATIENT
Start: 2018-03-07 | End: 2018-06-20 | Stop reason: SDUPTHER

## 2018-04-03 ENCOUNTER — OFFICE VISIT (OUTPATIENT)
Dept: CARDIOLOGY CLINIC | Age: 48
End: 2018-04-03

## 2018-04-03 VITALS
WEIGHT: 258.4 LBS | HEIGHT: 65 IN | DIASTOLIC BLOOD PRESSURE: 86 MMHG | SYSTOLIC BLOOD PRESSURE: 122 MMHG | BODY MASS INDEX: 43.05 KG/M2 | HEART RATE: 60 BPM | OXYGEN SATURATION: 99 %

## 2018-04-03 DIAGNOSIS — Z95.5 S/P CORONARY ARTERY STENT PLACEMENT: ICD-10-CM

## 2018-04-03 DIAGNOSIS — I25.119 ATHEROSCLEROSIS OF NATIVE CORONARY ARTERY OF NATIVE HEART WITH ANGINA PECTORIS (HCC): Primary | ICD-10-CM

## 2018-04-03 DIAGNOSIS — I10 ESSENTIAL HYPERTENSION: ICD-10-CM

## 2018-04-03 DIAGNOSIS — E78.5 HYPERLIPIDEMIA WITH TARGET LDL LESS THAN 70: ICD-10-CM

## 2018-04-03 NOTE — MR AVS SNAPSHOT
[address] [facility] 
[phone] Patient: Elba Saleem MRN: [id number] Visit Information Date & Time Provider Department Dept. Phone Encounter #  
 4/3/2018  2:00 PM Rohan Esparza NP [facility] [phone] Your Appointments 2018  2:30 PM  
STRESS ECHOCARDIOGRAMS with [place], [facility] [address]) Appt Note: Mica Kelly ECHO TTE STRESS EXRCSE COMP W OR WO CONTR [[id number]] (Order [id number]) ,[address]  
[phone] [address] [facility]  
  
    
 2018  4:00 PM  
ESTABLISHED PATIENT with Jacqueline Jenkins MD  
[facility] [address] [place] [address]) Appt Note: Per Dr. Toshia Linder, annual f/u-scc; Dr Toshia Linder pt saw Mica Kelly 4/3 and had an [address] [facility]  
[phone] [address] [facility] Upcoming Health Maintenance Date Due  
 PAP AKA CERVICAL CYTOLOGY 2015 Influenza Age 5 to Adult 2017 DTaP/Tdap/Td series (2 - Td) 2027 Allergies as of 4/3/2018  Review Complete On: 4/3/2018 By: Rohan Esparza NP Severity Noted Reaction Type Reactions Amitiza [Lubiprostone]  10/23/2009    Nausea Only Lipitor [Atorvastatin]  2014    Myalgia Memory disturbance Nsaids (Non-steroidal Anti-inflammatory Drug)  2017    Other (comments) AVOID DUE TO SEVERE PUD Sucralose  05/10/2017    Anxiety Current Immunizations  Reviewed on 2017 Name Date Td 2013 Not reviewed this visit You Were Diagnosed With   
  
 Codes Comments Essential hypertension    -  Primary ICD-10-CM: I10 
ICD-9-CM: 401.9 Hyperlipidemia with target LDL less than 70     ICD-10-CM: E78.5 ICD-9-CM: 272.4 Atherosclerosis of native coronary artery of native heart with angina pectoris ([facility] [address]     ICD-10-CM: I25.119 ICD-9-CM: 414.01, 413.9 S/P coronary artery stent placement     ICD-10-CM: Z95.5 ICD-9-CM: V45.82 Vitals BP Pulse Height(growth percentile) Weight(growth percentile) SpO2 BMI  
 122/86 (BP 1 Location: Right arm, BP Patient Position: Sitting) 60 5' 5\" (1.651 m) 258 lb 6.4 oz (117.2 kg) 99% 43 kg/m2 OB Status Smoking Status Having regular periods Never Smoker Vitals History BMI and BSA Data Body Mass Index Body Surface Area 43 kg/m 2 2.32 m 2 Preferred Pharmacy Pharmacy Name Phone [id number] Denia Mccoy, [address] [place] [address] [phone] Your Updated Medication List  
  
   
This list is accurate as of 4/3/18  2:57 PM.  Always use your most recent med list.  
  
  
  
  
 aspirin delayed-release 81 mg tablet Take 1 Tab by mouth daily. esomeprazole 40 mg capsule Commonly known as:  NEXIUM  
TAKE ONE CAPSULE BY MOUTH DAILY 1/2 HR BEFORE BREAKFAST  
  
 fluticasone 50 mcg/actuation nasal spray Commonly known as:  Lydia Mealy INHALE IN EACH NOSTRIL TWICE DAILY LINZESS 290 mcg Cap capsule Generic drug:  linaclotide  
as needed. LIZBET Dodge  
  
 metoprolol tartrate 25 mg tablet Commonly known as:  LOPRESSOR  
TAKE 1/2 TABLET BY MOUTH TWICE A DAY  
  
 omega-3 fatty acids-vitamin e 1,000 mg Cap Commonly known as:  FISH OIL Take 1 Cap by mouth daily. Take 1 tablet daily x 2 weeks. Increase to 2 tablets daily PHAZYME 180 mg Cap Generic drug:  simethicone Take  by mouth. rosuvastatin 20 mg tablet Commonly known as:  CRESTOR  
TAKE 1 TABLET BY MOUTH AT BEDTIME  
  
 sucralfate 100 mg/mL suspension Commonly known as:  Samul Se Take  by mouth four (4) times daily. ZANTAC 150 mg tablet Generic drug:  raNITIdine Take 150 mg by mouth two (2) times a day. We Performed the Following AMB POC EKG ROUTINE W/ 12 LEADS, INTER & REP [06257 CPT(R)] To-Do List   
 04/04/2018 ECHO:  ECHO TTE STRESS EXRCSE COMP W OR WO CONTR Introducing [facility]! Dear Shira Garcia: Thank you for requesting a MeBeam account. Our records indicate that you already have an active MeBeam account. You can access your account anytime at [email or web address] Did you know that you can access your hospital and ER discharge instructions at any time in MeBeam? You can also review all of your test results from your hospital stay or ER visit. Additional Information If you have questions, please visit the Frequently Asked Questions section of the MeBeam website at [email or web address] Remember, MeBeam is NOT to be used for urgent needs. For medical emergencies, dial 911. Now available from your iPhone and Android! Please provide this summary of care documentation to your next provider. Your primary care clinician is listed as Nilam Baker. If you have any questions after today's visit, please call [phone].
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
## 2018-04-03 NOTE — PROGRESS NOTES
Chief Complaint   Patient presents with    ED Follow-up     PATIENT WAS SEEN IN [place] [facility] ON 3/28/18 FOR CHEST PAIN      1. Have you been to the ER, urgent care clinic since your last visit? Hospitalized since your last visit? YES, [place] [facility] FOR CHEST PAIN ON 3/28/18    2. Have you seen or consulted any other health care providers outside of the [address] [place] [address] since your last visit? Include any pap smears or colon screening.  NO

## 2018-04-12 ENCOUNTER — CLINICAL SUPPORT (OUTPATIENT)
Dept: CARDIOLOGY CLINIC | Age: 48
End: 2018-04-12

## 2018-04-12 DIAGNOSIS — I25.119 ATHEROSCLEROSIS OF NATIVE CORONARY ARTERY OF NATIVE HEART WITH ANGINA PECTORIS (HCC): Primary | ICD-10-CM

## 2018-04-12 DIAGNOSIS — I20.0 UNSTABLE ANGINA PECTORIS (HCC): ICD-10-CM

## 2018-04-12 NOTE — PROGRESS NOTES
Identified patient using full name and . Patient education for testing complete. Patient verbalized understanding.     Marques Hwang RN

## 2018-04-16 ENCOUNTER — TELEPHONE (OUTPATIENT)
Dept: CARDIOLOGY CLINIC | Age: 48
End: 2018-04-16

## 2018-04-16 NOTE — PROGRESS NOTES
Bettina: Please call patient, stress echo normal, follow up with Dr Prema Flores is she remains to have symptoms or issues.

## 2018-04-16 NOTE — TELEPHONE ENCOUNTER
----- Message from Fly Fuller NP sent at 4/16/2018  1:16 PM EDT -----  Mel Rios: Please call patient, stress echo normal, follow up with Dr Veras Serum is she remains to have symptoms or issues.

## 2018-06-20 RX ORDER — METOPROLOL TARTRATE 25 MG/1
TABLET, FILM COATED ORAL
Qty: 90 TAB | Refills: 0 | Status: SHIPPED | COMMUNITY
Start: 2018-06-20 | End: 2018-09-24 | Stop reason: SDUPTHER

## 2018-09-24 RX ORDER — METOPROLOL TARTRATE 25 MG/1
TABLET, FILM COATED ORAL
Qty: 90 TAB | Refills: 0 | Status: SHIPPED | OUTPATIENT
Start: 2018-09-24 | End: 2019-05-21 | Stop reason: SDUPTHER

## 2019-01-31 ENCOUNTER — TELEPHONE (OUTPATIENT)
Dept: FAMILY MEDICINE CLINIC | Age: 49
End: 2019-01-31

## 2019-01-31 NOTE — TELEPHONE ENCOUNTER
Patient found out that she can have her labs completed at [address] [place] [address] and would like to have them done here one week prior to her appointment with Dr. Tito Mc. Please order labs and let  know when they are entered so that we can call patient to schedule the lab appointment one week prior.

## 2019-01-31 NOTE — TELEPHONE ENCOUNTER
Patient called into the office to schedule a physical. Patient scheduled a physical in March and needs to have lab work done at [facility] due to [place]. Patient would like to have lab work done before the appointment. Patient is requesting to  the lab orders to take to [facility].     Can lab orders be placed so a PSR can notify patient when they are ready to be picked up? Thanks!

## 2019-02-26 ENCOUNTER — TELEPHONE (OUTPATIENT)
Dept: FAMILY MEDICINE CLINIC | Age: 49
End: 2019-02-26

## 2019-02-26 NOTE — TELEPHONE ENCOUNTER
----- Message from Ayala Webb sent at 2/26/2019  8:58 AM EST -----  Regarding: Dr. Riley Camp is needing a referral to be sent over to her cardiologists office at [facility] for an appointment she has tomorrow. Fax number: [phone] Dr. Xander Roberts contact: [phone]    Please contact patient with an update.  Phone: [phone]

## 2019-02-26 NOTE — TELEPHONE ENCOUNTER
Called patient, patient gave 2 identifiers. Informed patient that we do not have an updted insurance card. Patient's insurance is ineligible. Patient was provided the fax number and was asked to send the front and back of the card to get referral authorization through insurance company. Patient verbally understood.

## 2019-02-27 ENCOUNTER — OFFICE VISIT (OUTPATIENT)
Dept: CARDIOLOGY CLINIC | Age: 49
End: 2019-02-27

## 2019-02-27 VITALS
DIASTOLIC BLOOD PRESSURE: 88 MMHG | RESPIRATION RATE: 16 BRPM | OXYGEN SATURATION: 98 % | BODY MASS INDEX: 43.13 KG/M2 | HEART RATE: 64 BPM | HEIGHT: 65 IN | WEIGHT: 258.9 LBS | SYSTOLIC BLOOD PRESSURE: 135 MMHG

## 2019-02-27 DIAGNOSIS — E78.5 HYPERLIPIDEMIA WITH TARGET LDL LESS THAN 70: ICD-10-CM

## 2019-02-27 DIAGNOSIS — I10 ESSENTIAL HYPERTENSION: ICD-10-CM

## 2019-02-27 DIAGNOSIS — I25.119 ATHEROSCLEROSIS OF NATIVE CORONARY ARTERY OF NATIVE HEART WITH ANGINA PECTORIS (HCC): Primary | ICD-10-CM

## 2019-02-27 RX ORDER — HYDROGEN PEROXIDE 3 %
SOLUTION, NON-ORAL MISCELLANEOUS
COMMUNITY
End: 2019-03-12 | Stop reason: DRUGHIGH

## 2019-02-27 RX ORDER — METRONIDAZOLE 500 MG/1
TABLET ORAL
COMMUNITY
End: 2019-02-27

## 2019-02-27 RX ORDER — PREDNISONE 20 MG/1
TABLET ORAL
COMMUNITY
End: 2019-02-27

## 2019-02-27 RX ORDER — NITROGLYCERIN 0.4 MG/1
0.4 TABLET SUBLINGUAL
Qty: 25 TAB | Refills: 1 | Status: SHIPPED | OUTPATIENT
Start: 2019-02-27

## 2019-02-27 NOTE — PROGRESS NOTES
Subjective/HPI:     Ms. Leyda Traylor is a 51 y.o. female is here for routine f/u. She has a PMHx of CAD, HTN, HLD, GERD and obesity. She is doing well. She denies complaints of shortness of breath, orthopnea, PND or edema. She has been having some right-sided chest pain symptoms that she associates with acid reflux. This is improved with Nexium and Pepto-Bismol. She has been exercising regularly and focusing on dietary modifications, but finds it difficult to lose weight. She notes leg fatigue that started yesterday. She does have varicose veins and spider veins. PCP Provider  Owen Olivia, DO    Patient Active Problem List   Diagnosis Code    Acne vulgaris L70.0    Varicose veins I83.90    GERD (gastroesophageal reflux disease) K21.9    IBS (irritable bowel syndrome) K58.9    Unstable angina pectoris (HCC) I20.0    Hyperlipidemia with target LDL less than 70 E78.5    Family history of ischemic heart disease Z82.49    S/P coronary artery stent placement Z95.5    Coronary atherosclerosis of native coronary artery I25.10    Spider veins of limb I78.1    Heart palpitations R00.2    Essential hypertension I10    Statin intolerance Z78.9    Family history of heart attack Z82.49    Family history of pancreatic cancer Z80.0    Chronic cholecystitis K81.1    PUD (peptic ulcer disease) K27.9    Family history of stroke Z80.3    Family history of breast cancer Z80.3    Family history of systemic lupus erythematosus Z82.69    Family history of rheumatoid arthritis Z82.61    Family history of obesity Z83.49    Irritable bowel syndrome with constipation K58.1     Past Surgical History:   Procedure Laterality Date    CARDIAC CATHETERIZATION  6/12/2012, 08/12/2013    Dr. Tate [place].  CARDIAC SURG PROCEDURE UNLIST  6/12/12    PTCA with stent to LAD.   Dr. Antwan Martínez  05/02/07;05/02/12    Dr. Connor Jacob  10/2015    HX ENDOSCOPY  10/18/2016 EGD due to epigastric pain. HH.  PUD. Dr. April Church.  HX GI  05/02/12    EGD. due to severe acid reflux. Dr. Kadeem Goss.     HX LAP CHOLECYSTECTOMY  10/28/15    by Dr Ya Silvestre     Family History   Problem Relation Age of Onset    Hypertension Mother     Heart Disease Mother         pacemaker    Heart Attack Mother 36    High Cholesterol Mother     Stroke Mother     Cancer Mother 59        pancreatic, liver    Diabetes Mother    [facility] Arthritis-rheumatoid Mother     Asthma Sister     Heart Attack Maternal Grandmother     Obesity Maternal Grandmother     Breast Cancer Maternal Aunt     Cancer Maternal Aunt         lung    Cancer Maternal Aunt     SLE Other         x 2 MATERNAL COUSINS    Other Cousin         OTHER    Anesth Problems Neg Hx      Social History     Socioeconomic History    Marital status: SINGLE     Spouse name: Not on file    Number of children: Not on file    Years of education: Not on file    Highest education level: Not on file   Social Needs    Financial resource strain: Not on file    Food insecurity - worry: Not on file    Food insecurity - inability: Not on file   VoÃ¶lks needs - medical: Not on file   VoÃ¶lks needs - non-medical: Not on file   Occupational History    Not on file   Tobacco Use    Smoking status: Never Smoker    Smokeless tobacco: Never Used    Tobacco comment: lived with smoker mom x 17 yrs   Substance and Sexual Activity    Alcohol use: No     Alcohol/week: 0.0 oz    Drug use: No    Sexual activity: Yes     Partners: Male     Birth control/protection: Condom, Pill   Other Topics Concern    Not on file   Social History Narrative    Not on file       Allergies   Allergen Reactions    Amitiza [Lubiprostone] Nausea Only    Lipitor [Atorvastatin] Myalgia     Memory disturbance    Nsaids (Non-Steroidal Anti-Inflammatory Drug) Other (comments)     AVOID DUE TO SEVERE PUD    Sucralose Anxiety        Current Outpatient Medications   Medication Sig    esomeprazole (NEXIUM) 20 mg capsule Nexium    nitroglycerin (NITROSTAT) 0.4 mg SL tablet 1 Tab by SubLINGual route every five (5) minutes as needed (call 911 if not relieved by 3).  metoprolol tartrate (LOPRESSOR) 25 mg tablet TAKE 1/2 TABLET BY MOUTH TWICE A DAY (Patient taking differently: pt taking 1 tablet daily)    rosuvastatin (CRESTOR) 20 mg tablet TAKE 1 TABLET BY MOUTH AT BEDTIME    fluticasone (FLONASE) 50 mcg/actuation nasal spray INHALE IN EACH NOSTRIL TWICE DAILY    esomeprazole (NEXIUM) 40 mg capsule TAKE ONE CAPSULE BY MOUTH DAILY 1/2 HR BEFORE BREAKFAST    raNITIdine (ZANTAC) 150 mg tablet Take 150 mg by mouth two (2) times a day.  sucralfate (CARAFATE) 100 mg/mL suspension Take  by mouth four (4) times daily.  simethicone (PHAZYME) 180 mg cap Take  by mouth.  LINZESS 290 mcg cap capsule as needed. DDidier Manamys    aspirin delayed-release 81 mg tablet Take 1 Tab by mouth daily.  omega-3 fatty acids-vitamin e (FISH OIL) 1,000 mg Cap Take 1 Cap by mouth daily. Take 1 tablet daily x 2 weeks. Increase to 2 tablets daily     No current facility-administered medications for this visit. I have reviewed the problem list, allergy list, medical history, family, social history and medications. Review of Symptoms:    Review of Systems   Constitutional: Negative for chills, fever and weight loss. HENT: Negative for nosebleeds. Eyes: Negative for blurred vision and double vision. Respiratory: Negative for cough, shortness of breath and wheezing. Cardiovascular: Negative for chest pain, palpitations, orthopnea, leg swelling and PND. Gastrointestinal: Negative for abdominal pain, blood in stool, diarrhea, nausea and vomiting. Musculoskeletal: Negative for joint pain. Skin: Negative for rash. Neurological: Negative for dizziness, tingling and loss of consciousness. Endo/Heme/Allergies: Does not bruise/bleed easily. Physical Exam:      General: Well developed, in no acute distress, cooperative and alert  HEENT: No carotid bruits, no JVD, trach is midline. Neck Supple, PEERL, EOM intact. Heart:  reg rate and rhythm; normal S1/S2; no murmurs, gallops or rubs.   Respiratory: Clear bilaterally x 4, no wheezing or rales  Abdomen:   Soft, non-tender, no distention, no masses. + BS.   Extremities:  Normal cap refill, no cyanosis, atraumatic. No edema. Neuro: A&Ox3, speech clear, gait stable. Skin: Skin color is normal. No rashes or lesions.  Non diaphoretic  Vascular: 2+ pulses symmetric in all extremities    Vitals:    02/27/19 1612 02/27/19 1615   BP: 145/86 135/88   Pulse: 64    Resp: 16    SpO2: 98%    Weight: 258 lb 14.4 oz (117.4 kg)    Height: 5' 5\" (1.651 m)        Cardiographics    ECG: sinus rhythm  Results for orders placed or performed during the hospital encounter of 02/25/17   EKG, 12 LEAD, INITIAL   Result Value Ref Range    Ventricular Rate 69 BPM    Atrial Rate 69 BPM    P-R Interval 142 ms    QRS Duration 84 ms    Q-T Interval 392 ms    QTC Calculation (Bezet) 420 ms    Calculated P Axis 46 degrees    Calculated R Axis 21 degrees    Calculated T Axis 30 degrees    Diagnosis       Normal sinus rhythm  When compared with ECG of 26-AUG-2016 15:56,  No significant change was found  Confirmed by Patricia Valenzuela ([id number]) on 2/25/2017 10:54:49 AM         Cardiology Labs:  Lab Results   Component Value Date/Time    Cholesterol, total 121 05/15/2017 07:00 AM    HDL Cholesterol 39 (L) 05/15/2017 07:00 AM    LDL, calculated 77 05/15/2017 07:00 AM    Triglyceride 78 05/15/2017 07:00 AM    CHOL/HDL Ratio 5.2 (H) 06/13/2012 04:20 AM       Lab Results   Component Value Date/Time    Sodium 140 05/15/2017 07:00 AM    Potassium 4.6 05/15/2017 07:00 AM    Chloride 104 05/15/2017 07:00 AM    CO2 27 05/15/2017 07:00 AM    Anion gap 9 05/15/2017 07:00 AM    Glucose 91 05/15/2017 07:00 AM    BUN 11 05/15/2017 07:00 AM Creatinine 0.7 05/15/2017 07:00 AM    BUN/Creatinine ratio 15 05/15/2017 07:00 AM    GFR est AA >60 2017 01:08 AM    GFR est non-AA >60 2017 01:08 AM    Calcium 8.9 05/15/2017 07:00 AM    Bilirubin, total 0.3 05/15/2017 07:00 AM    AST (SGOT) 17 05/15/2017 07:00 AM    Alk. phosphatase 81 05/15/2017 07:00 AM    Protein, total 6.5 05/15/2017 07:00 AM    Albumin 3.5 (L) 05/15/2017 07:00 AM    Globulin 3.7 2017 01:08 AM    A-G Ratio 0.9 (L) 2017 01:08 AM    ALT (SGPT) 11 05/15/2017 07:00 AM        Orders Placed This Encounter    AMB POC EKG ROUTINE W/ 12 LEADS, INTER & REP     Order Specific Question:   Reason for Exam:     Answer:   routine    esomeprazole (NEXIUM) 20 mg capsule     Sig: Nexium    DISCONTD: metroNIDAZOLE (FLAGYL) 500 mg tablet     Sig: metronidazole 500 mg tablet    DISCONTD: predniSONE (DELTASONE) 20 mg tablet     Sig: prednisone 20 mg tablet    nitroglycerin (NITROSTAT) 0.4 mg SL tablet     Si Tab by SubLINGual route every five (5) minutes as needed (call 911 if not relieved by 3). Dispense:  25 Tab     Refill:  1        Assessment:     Assessment:       ICD-10-CM ICD-9-CM    1. Atherosclerosis of native coronary artery of native heart with angina pectoris (HCC) I25.119 414.01 AMB POC EKG ROUTINE W/ 12 LEADS, INTER & REP     413.9    2. Essential hypertension I10 401.9    3. Hyperlipidemia with target LDL less than 70 E78.5 272.4         Plan:     1. Atherosclerosis of native coronary artery of native heart with angina pectoris ([address]  Stable and without anginal or anginal equivalent symptoms. Stress echo in 2018 was negative for ischemia with good functional capacity, achieving 9.2 METs, with preserved LV ejection fraction. Continue medical management and risk factor modification.  - AMB POC EKG ROUTINE W/ 12 LEADS, INTER & REP    2. Essential hypertension  BP controlled; continue anti-hypertensive therapy and low sodium diet.     3. Hyperlipidemia with target LDL less than 70  Continue statin therapy and low fat, low cholesterol diet. Lipid surveillance at least annually by PCP. 4.  Varicose veins  Recommend compression stockings; discussed referral to vein clinic with Dr. Tashi White, but she would like to hold off for now. She will call if she becomes interested in referral.    5. BMI 43:  Counseled on diet and exercise- eventual goal of 30-60 minutes 5-7 times a week as per AHA guidelines. F/u in 1 year.       HERBERTH Travis-BC  Abigail Domingo MD

## 2019-02-27 NOTE — PROGRESS NOTES
1. Have you been to the ER, urgent care clinic since your last visit? Hospitalized since your last visit? No    2. Have you seen or consulted any other health care providers outside of the [address] [place] [address] since your last visit? Include any pap smears or colon screening. No     Chief Complaint   Patient presents with    Follow-up     CAD, stent 2012     Pt states she has occasional chest pain, but she states she has lots of stomach issues.

## 2019-03-01 DIAGNOSIS — I20.0 UNSTABLE ANGINA PECTORIS (HCC): ICD-10-CM

## 2019-03-01 DIAGNOSIS — I25.119 ATHEROSCLEROSIS OF NATIVE CORONARY ARTERY OF NATIVE HEART WITH ANGINA PECTORIS (HCC): ICD-10-CM

## 2019-03-01 DIAGNOSIS — Z95.5 S/P CORONARY ARTERY STENT PLACEMENT: Primary | ICD-10-CM

## 2019-03-05 ENCOUNTER — LAB ONLY (OUTPATIENT)
Dept: FAMILY MEDICINE CLINIC | Age: 49
End: 2019-03-05

## 2019-03-05 DIAGNOSIS — Z00.00 PHYSICAL EXAM: Primary | ICD-10-CM

## 2019-03-05 DIAGNOSIS — I10 ESSENTIAL HYPERTENSION: ICD-10-CM

## 2019-03-06 ENCOUNTER — TELEPHONE (OUTPATIENT)
Dept: CARDIOLOGY CLINIC | Age: 49
End: 2019-03-06

## 2019-03-06 LAB
25(OH)D3+25(OH)D2 SERPL-MCNC: 30.8 NG/ML (ref 30–100)
ALBUMIN SERPL-MCNC: 3.9 G/DL (ref 3.5–5.5)
ALBUMIN/CREAT UR: 2.7 MG/G CREAT (ref 0–30)
ALBUMIN/GLOB SERPL: 1.3 {RATIO} (ref 1.2–2.2)
ALP SERPL-CCNC: 81 IU/L (ref 39–117)
ALT SERPL-CCNC: 14 IU/L (ref 0–32)
APPEARANCE UR: CLEAR
AST SERPL-CCNC: 17 IU/L (ref 0–40)
BACTERIA #/AREA URNS HPF: ABNORMAL /[HPF]
BILIRUB SERPL-MCNC: 0.2 MG/DL (ref 0–1.2)
BILIRUB UR QL STRIP: NEGATIVE
BUN SERPL-MCNC: 12 MG/DL (ref 6–24)
BUN/CREAT SERPL: 14 (ref 9–23)
CALCIUM SERPL-MCNC: 9.4 MG/DL (ref 8.7–10.2)
CASTS URNS QL MICRO: ABNORMAL /LPF
CHLORIDE SERPL-SCNC: 102 MMOL/L (ref 96–106)
CHOLEST SERPL-MCNC: 134 MG/DL (ref 100–199)
CO2 SERPL-SCNC: 24 MMOL/L (ref 20–29)
COLOR UR: YELLOW
CREAT SERPL-MCNC: 0.83 MG/DL (ref 0.57–1)
CREAT UR-MCNC: 169.5 MG/DL
EPI CELLS #/AREA URNS HPF: >10 /HPF
ERYTHROCYTE [DISTWIDTH] IN BLOOD BY AUTOMATED COUNT: 13.8 % (ref 12.3–15.4)
GLOBULIN SER CALC-MCNC: 3 G/DL (ref 1.5–4.5)
GLUCOSE SERPL-MCNC: 114 MG/DL (ref 65–99)
GLUCOSE UR QL: NEGATIVE
HCT VFR BLD AUTO: 40 % (ref 34–46.6)
HDLC SERPL-MCNC: 42 MG/DL
HGB BLD-MCNC: 13 G/DL (ref 11.1–15.9)
HGB UR QL STRIP: NEGATIVE
INTERPRETATION, 910389: NORMAL
KETONES UR QL STRIP: NEGATIVE
LDLC SERPL CALC-MCNC: 77 MG/DL (ref 0–99)
LEUKOCYTE ESTERASE UR QL STRIP: ABNORMAL
MCH RBC QN AUTO: 29 PG (ref 26.6–33)
MCHC RBC AUTO-ENTMCNC: 32.5 G/DL (ref 31.5–35.7)
MCV RBC AUTO: 89 FL (ref 79–97)
MICRO URNS: ABNORMAL
MICROALBUMIN UR-MCNC: 4.5 UG/ML
MUCOUS THREADS URNS QL MICRO: PRESENT
NITRITE UR QL STRIP: NEGATIVE
PH UR STRIP: 5.5 [PH] (ref 5–7.5)
PLATELET # BLD AUTO: 290 X10E3/UL (ref 150–379)
POTASSIUM SERPL-SCNC: 4.4 MMOL/L (ref 3.5–5.2)
PROT SERPL-MCNC: 6.9 G/DL (ref 6–8.5)
PROT UR QL STRIP: NEGATIVE
RBC # BLD AUTO: 4.48 X10E6/UL (ref 3.77–5.28)
RBC #/AREA URNS HPF: ABNORMAL /HPF
SODIUM SERPL-SCNC: 140 MMOL/L (ref 134–144)
SP GR UR: 1.02 (ref 1–1.03)
TRIGL SERPL-MCNC: 77 MG/DL (ref 0–149)
TSH SERPL DL<=0.005 MIU/L-ACNC: 1.84 UIU/ML (ref 0.45–4.5)
UROBILINOGEN UR STRIP-MCNC: 0.2 MG/DL (ref 0.2–1)
VLDLC SERPL CALC-MCNC: 15 MG/DL (ref 5–40)
WBC # BLD AUTO: 6.4 X10E3/UL (ref 3.4–10.8)
WBC #/AREA URNS HPF: ABNORMAL /HPF

## 2019-03-06 NOTE — TELEPHONE ENCOUNTER
Pt was told to call back about her test results and labs are back just wanted to make sure everything is ok [place] please return pt call thx.

## 2019-03-08 NOTE — TELEPHONE ENCOUNTER
Labs ordered by Dr. Antonio Shrestha. Chol looks good, I don't see any major abnormalities. Mildly abnormal urinalysis looks like contamination from skin rather than UTI, but defer to PCP. Copy to Dr. Antonio Shrestha.

## 2019-03-12 ENCOUNTER — OFFICE VISIT (OUTPATIENT)
Dept: FAMILY MEDICINE CLINIC | Age: 49
End: 2019-03-12

## 2019-03-12 VITALS
WEIGHT: 256.3 LBS | BODY MASS INDEX: 42.7 KG/M2 | HEART RATE: 76 BPM | HEIGHT: 65 IN | SYSTOLIC BLOOD PRESSURE: 123 MMHG | RESPIRATION RATE: 18 BRPM | TEMPERATURE: 98.4 F | DIASTOLIC BLOOD PRESSURE: 78 MMHG | OXYGEN SATURATION: 98 %

## 2019-03-12 DIAGNOSIS — I83.893 VARICOSE VEINS OF BILATERAL LOWER EXTREMITIES WITH OTHER COMPLICATIONS: ICD-10-CM

## 2019-03-12 DIAGNOSIS — Z82.69 FAMILY HISTORY OF SYSTEMIC LUPUS ERYTHEMATOSUS: ICD-10-CM

## 2019-03-12 DIAGNOSIS — I10 ESSENTIAL HYPERTENSION: ICD-10-CM

## 2019-03-12 DIAGNOSIS — K27.9 PUD (PEPTIC ULCER DISEASE): ICD-10-CM

## 2019-03-12 DIAGNOSIS — E66.01 OBESITY, CLASS III, BMI 40-49.9 (MORBID OBESITY) (HCC): ICD-10-CM

## 2019-03-12 DIAGNOSIS — Z95.5 S/P CORONARY ARTERY STENT PLACEMENT: ICD-10-CM

## 2019-03-12 DIAGNOSIS — Z00.00 WELL WOMAN EXAM (NO GYNECOLOGICAL EXAM): Primary | ICD-10-CM

## 2019-03-12 DIAGNOSIS — Z80.3 FAMILY HISTORY OF BREAST CANCER: ICD-10-CM

## 2019-03-12 DIAGNOSIS — N93.8 DUB (DYSFUNCTIONAL UTERINE BLEEDING): ICD-10-CM

## 2019-03-12 DIAGNOSIS — F51.04 CHRONIC INSOMNIA: ICD-10-CM

## 2019-03-12 DIAGNOSIS — Z80.0 FAMILY HISTORY OF PANCREATIC CANCER: ICD-10-CM

## 2019-03-12 DIAGNOSIS — Z83.49 FAMILY HISTORY OF OBESITY: ICD-10-CM

## 2019-03-12 DIAGNOSIS — Z82.49 FAMILY HISTORY OF ABDOMINAL AORTIC ANEURYSM (AAA): ICD-10-CM

## 2019-03-12 DIAGNOSIS — Z82.61 FAMILY HISTORY OF RHEUMATOID ARTHRITIS: ICD-10-CM

## 2019-03-12 DIAGNOSIS — Z82.49 FAMILY HISTORY OF HEART ATTACK: ICD-10-CM

## 2019-03-12 DIAGNOSIS — E11.9 DIABETES MELLITUS TYPE 2, DIET-CONTROLLED (HCC): ICD-10-CM

## 2019-03-12 DIAGNOSIS — R10.13 EPIGASTRIC PAIN: ICD-10-CM

## 2019-03-12 DIAGNOSIS — E78.5 HYPERLIPIDEMIA WITH TARGET LDL LESS THAN 70: ICD-10-CM

## 2019-03-12 DIAGNOSIS — Z82.3 FAMILY HISTORY OF STROKE: ICD-10-CM

## 2019-03-12 LAB — HBA1C MFR BLD HPLC: 6.4 %

## 2019-03-12 NOTE — ACP (ADVANCE CARE PLANNING)
Discussed ACP with patient. Gave pt an Right to [facility]. Patient prefers to read it on her own. Declines referral to [facility] team at this time. Patient will bring document to her next office visit or attach it to her MyChart record.

## 2019-03-12 NOTE — PATIENT INSTRUCTIONS
Learning About Low-Carbohydrate Diets for Weight Loss  What is a low-carbohydrate diet? Low-carb diets avoid foods that are high in carbohydrate. These high-carb foods include pasta, bread, rice, cereal, fruits, and starchy vegetables. Instead, these diets usually have you eat foods that are high in fat and protein. Many people lose weight quickly on a low-carb diet. But the early weight loss is water. People on this diet often gain the weight back after they start eating carbs again. Not all diet plans are safe or work well. A lot of the evidence shows that low-carb diets aren't healthy. That's because these diets often don't include healthy foods like fruits and vegetables. Losing weight safely means balancing protein, fat, and carbs with every meal and snack. And low-carb diets don't always provide the vitamins, minerals, and fiber you need. If you have a serious medical condition, talk to your doctor before you try any diet. These conditions include kidney disease, heart disease, type 2 diabetes, high cholesterol, and high blood pressure. If you are pregnant, it may not be safe for your baby if you are on a low-carb diet. How can you lose weight safely? You might have heard that a diet plan helped another person lose weight. But that doesn't mean that it will work for you. It is very hard to stay on a diet that includes lots of big changes in your eating habits. If you want to get to a healthy weight and stay there, making healthy lifestyle changes will often work better than dieting. These steps can help. · Make a plan for change. Work with your doctor to create a plan that is right for you. · See a dietitian. He or she can show you how to make healthy changes in your eating habits. · Manage stress. If you have a lot of stress in your life, it can be hard to focus on making healthy changes to your daily habits. · Track your food and activity.  You are likely to do better at losing weight if you keep track of what you eat and what you do. Follow-up care is a key part of your treatment and safety. Be sure to make and go to all appointments, and call your doctor if you are having problems. It's also a good idea to know your test results and keep a list of the medicines you take. Where can you learn more? Go to http://daily-germania.info/. Enter A121 in the search box to learn more about \"Learning About Low-Carbohydrate Diets for Weight Loss. \"  Current as of: March 28, 2018  Content Version: 11.9  © 2771-0530 [employer]. Care instructions adapted under license by [employer] (which disclaims liability or warranty for this information). If you have questions about a medical condition or this instruction, always ask your healthcare professional. [address] any warranty or liability for your use of this information. EXERCISE 150 MINUTES WEEKLY. THIS CAN BE ACHIEVED BY WORKING OUT OR WALKING A MINIMUM OF 30 MINUTES FOR 5 DAYS WEEKLY. YOU CAN EXERCISE IN INCREMENTS OF 10-15 MINUTES UP TO 3 TIMES A DAY. Consider performing \"brainless exercise. \"  Choose your favorite tv program.  Five minutes before and for 5 minutes after the tv program, stretch your body. While the program is on, walk in place watching the show. When commercials come on, rest or walk around the house to do other things. When the program begins, return to walking in place. When you are able keep walking during the commercials and add light weights to your ankles or hands. By the end of the show, you would have walked 30 minutes. If you need shorter spurts of exercise, walk during the commercials and rest during the show. Drink to glasses of water prior to any exercise to prevent dehydration and to improve the results of the work out. Your RESTING HEART RATE is the number of times your heart beats per minute when you are not exerting yourself.   The more fit you are, the lower your resting heart rate will be. Your MAXIMUM HEART RATE is the number of times per minute your heart pumps when it is working at 100% capacity. NEVER EXERCISE AT YOUR MAXIMUM HEART RATE. MAX HEART RATE = 220 - your age    For example, in a 48year old, the maximum heart rate is 220-50 = 170 beats per minute    Your [place] is the number of beats per minute our heart should pump during aerobic exercise. Reaching your target heart rate indicates that your body is receiving maximum cardiovascular and fat burning benefits. If you are fit, your TARGET HEART RATE = 70-85% of your maximum Heart rate      For a 48year old with vigorous intensity physical activity,         Target heart rate= 170 bpm x .70 = 119 bpm     Target heart rate = 170 bpm x .85=  145 bpm        Therefore, your target heart rate during physical activity is 119-145      If you are not fit, TARGET HEART RATE = 50-70% of your maximum Heart rate    MODERATE CONSISTENT AEROBIC EXERCISE OR WALKING FOR AT LEAST 150 MINUTES WEEKLY IS AN ESSENTIAL PART OF ANY WEIGHT LOSS OF WEIGHT MAINTENANCE PROGRAM.     During WEIGHT LOSS PHASE, at least 75% of your exercise needs to be walking or moderate aerobic activity and 25% or 0% can be Isometric or Resistance type exercises (the latter can be deferred to the weight maintenance phase.)     During WEIGHT MAINTENANCE PHASE, at least 50% of your exercise needs to be aerobic and 50% Isometric or Resistance type exercises. BENEFITS OF MODERATE INTENSITY EXERCISE MOST DAYS OF THE WEEK:     1. Insulin Resistance improves 30-85%   2. Abdominal Fat decreases by 30%   3. Inflammatory Markers decrease by 30% (therefore pain decreases)   4. Systolic and Diastolic Blood Pressure decrease by 4 mmHg   5. HDL improves by 5%   6. Triglycerides decrease by 15%   7. Shift from small dense LDL to large dense LDL (therefore decrease Insulin Resistance)   8.   Decrease coagulability                  Learning About Diabetes Food Guidelines  Your Care Instructions    Meal planning is important to manage diabetes. It helps keep your blood sugar at a target level (which you set with your doctor). You don't have to eat special foods. You can eat what your family eats, including sweets once in a while. But you do have to pay attention to how often you eat and how much you eat of certain foods. You may want to work with a dietitian or a certified diabetes educator (CDE) to help you plan meals and snacks. A dietitian or CDE can also help you lose weight if that is one of your goals. What should you know about eating carbs? Managing the amount of carbohydrate (carbs) you eat is an important part of healthy meals when you have diabetes. Carbohydrate is found in many foods. · Learn which foods have carbs. And learn the amounts of carbs in different foods. ? Bread, cereal, pasta, and rice have about 15 grams of carbs in a serving. A serving is 1 slice of bread (1 ounce), ½ cup of cooked cereal, or 1/3 cup of cooked pasta or rice. ? Fruits have 15 grams of carbs in a serving. A serving is 1 small fresh fruit, such as an apple or orange; ½ of a banana; ½ cup of cooked or canned fruit; ½ cup of fruit juice; 1 cup of melon or raspberries; or 2 tablespoons of dried fruit. ? Milk and no-sugar-added yogurt have 15 grams of carbs in a serving. A serving is 1 cup of milk or 2/3 cup of no-sugar-added yogurt. ? Starchy vegetables have 15 grams of carbs in a serving. A serving is ½ cup of mashed potatoes or sweet potato; 1 cup winter squash; ½ of a small baked potato; ½ cup of cooked beans; or ½ cup cooked corn or green peas. · Learn how much carbs to eat each day and at each meal. A dietitian or CDE can teach you how to keep track of the amount of carbs you eat. This is called carbohydrate counting. · If you are not sure how to count carbohydrate grams, use the Plate Method to plan meals.  It is a good, quick way to make sure that you have a balanced meal. It also helps you spread carbs throughout the day. ? Divide your plate by types of foods. Put non-starchy vegetables on half the plate, meat or other protein food on one-quarter of the plate, and a grain or starchy vegetable in the final quarter of the plate. To this you can add a small piece of fruit and 1 cup of milk or yogurt, depending on how many carbs you are supposed to eat at a meal.  · Try to eat about the same amount of carbs at each meal. Do not \"save up\" your daily allowance of carbs to eat at one meal.  · Proteins have very little or no carbs per serving. Examples of proteins are beef, chicken, turkey, fish, eggs, tofu, cheese, cottage cheese, and peanut butter. A serving size of meat is 3 ounces, which is about the size of a deck of cards. Examples of meat substitute serving sizes (equal to 1 ounce of meat) are 1/4 cup of cottage cheese, 1 egg, 1 tablespoon of peanut butter, and ½ cup of tofu. How can you eat out and still eat healthy? · Learn to estimate the serving sizes of foods that have carbohydrate. If you measure food at home, it will be easier to estimate the amount in a serving of restaurant food. · If the meal you order has too much carbohydrate (such as potatoes, corn, or baked beans), ask to have a low-carbohydrate food instead. Ask for a salad or green vegetables. · If you use insulin, check your blood sugar before and after eating out to help you plan how much to eat in the future. · If you eat more carbohydrate at a meal than you had planned, take a walk or do other exercise. This will help lower your blood sugar. What else should you know? · Limit saturated fat, such as the fat from meat and dairy products. This is a healthy choice because people who have diabetes are at higher risk of heart disease. So choose lean cuts of meat and nonfat or low-fat dairy products.  Use olive or canola oil instead of butter or shortening when cooking. · Don't skip meals. Your blood sugar may drop too low if you skip meals and take insulin or certain medicines for diabetes. · Check with your doctor before you drink alcohol. Alcohol can cause your blood sugar to drop too low. Alcohol can also cause a bad reaction if you take certain diabetes medicines. Follow-up care is a key part of your treatment and safety. Be sure to make and go to all appointments, and call your doctor if you are having problems. It's also a good idea to know your test results and keep a list of the medicines you take. Where can you learn more? Go to http://daily-germania.info/. Enter U730 in the search box to learn more about \"Learning About Diabetes Food Guidelines. \"  Current as of: July 25, 2018  Content Version: 11.9  © 0576-9178 [employer]. Care instructions adapted under license by [employer] (which disclaims liability or warranty for this information). If you have questions about a medical condition or this instruction, always ask your healthcare professional. [place] [address] any warranty or liability for your use of this information. Learning About Type 2 Diabetes  What is type 2 diabetes? Insulin is a hormone that helps your body use sugar from your food as energy. Type 2 diabetes happens when your body can't use insulin the right way. Over time, the pancreas can't make enough insulin. If you don't have enough insulin, too much sugar stays in your blood. If you are overweight, get little or no exercise, or have type 2 diabetes in your family, you are more likely to have problems with the way insulin works in your body.  Americans, Hispanics, Native Americans,  Americans, and Pacific Islanders have a higher risk for type 2 diabetes.   Type 2 diabetes can be prevented or delayed with a healthy lifestyle, which includes staying at a healthy weight, making smart food choices, and getting regular exercise. What can you expect with type 2 diabetes? Evy Bell keep hearing about how important it is to keep your blood sugar within a target range. That's because over time, high blood sugar can lead to serious problems. It can:  · Harm your eyes, nerves, and kidneys. · Damage your blood vessels, leading to heart disease and stroke. · Reduce blood flow and cause nerve damage to parts of your body, especially your feet. This can cause slow healing and pain when you walk. · Make your immune system weak and less able to fight infections. When people hear the word \"diabetes,\" they often think of problems like these. But daily care and treatment can help prevent or delay these problems. The goal is to keep your blood sugar in a target range. That's the best way to reduce your chance of having more problems from diabetes. What are the symptoms? Some people who have type 2 diabetes may not have any symptoms early on. Many people with the disease don't even know they have it at first. But with time, diabetes starts to cause symptoms. You experience most symptoms of type 2 diabetes when your blood sugar is either too high or too low. The most common symptoms of high blood sugar include:  · Thirst.  · Frequent urination. · Weight loss. · Blurry vision. The symptoms of low blood sugar include:  · Sweating. · Shakiness. · Weakness. · Hunger. · Confusion. How can you prevent type 2 diabetes? The best way to prevent or delay type 2 diabetes is to adopt healthy habits, which include:  · Staying at a healthy weight. · Exercising regularly. · Eating healthy foods. How is type 2 diabetes treated? If you have type 2 diabetes, here are the most important things you can do. · Take your diabetes medicines. · Check your blood sugar as often as your doctor recommends. Also, get a hemoglobin A1c test at least every 6 months.   · Try to eat a variety of foods and to spread carbohydrate throughout the day. Carbohydrate raises blood sugar higher and more quickly than any other nutrient does. Carbohydrate is found in sugar, breads and cereals, fruit, starchy vegetables such as potatoes and corn, and milk and yogurt. · Get at least 30 minutes of exercise on most days of the week. Walking is a good choice. You also may want to do other activities, such as running, swimming, cycling, or playing tennis or team sports. If your doctor says it's okay, do muscle-strengthening exercises at least 2 times a week. · See your doctor for checkups and tests on a regular schedule. · If you have high blood pressure or high cholesterol, take the medicines as prescribed by your doctor. · Do not smoke. Smoking can make health problems worse. This includes problems you might have with type 2 diabetes. If you need help quitting, talk to your doctor about stop-smoking programs and medicines. These can increase your chances of quitting for good. Follow-up care is a key part of your treatment and safety. Be sure to make and go to all appointments, and call your doctor if you are having problems. It's also a good idea to know your test results and keep a list of the medicines you take. Where can you learn more? Go to http://daily-germania.info/. Enter V274 in the search box to learn more about \"Learning About Type 2 Diabetes. \"  Current as of: July 25, 2018  Content Version: 11.9  © 1621-0044 [employer]. Care instructions adapted under license by [employer] (which disclaims liability or warranty for this information). If you have questions about a medical condition or this instruction, always ask your healthcare professional. [place] [address] any warranty or liability for your use of this information.

## 2019-03-12 NOTE — PROGRESS NOTES
Renu Torres  Identified pt with two pt identifiers(name and ). Chief Complaint   Patient presents with    Physical     Rm 13         1. Have you been to the ER, urgent care clinic since your last visit? Hospitalized since your last visit? NO    2. Have you seen or consulted any other health care providers outside of the [address] [place] [address] since your last visit? Include any pap smears or colon screening. NO pap smear scheduled for next month      Advance Care Planning    In the event something were to happen to you and you were unable to speak on your behalf, do you have an Advance Directive/ Living Will in place stating your wishes? NO    If yes, do we have a copy on file NO    If no, would you like information YES    My Chart     My chart gives you direct online access to portions of the electronic medical record (EMR) where your doctor stores your health information (ie, lab results, appointment information, medications, immunizations, and more. It is free. Would you like to set up your my chart? YES    [unfilled]    Weight Metrics 3/12/2019 2019 4/3/2018 5/10/2017 5/10/2017 2017 3/27/2017   Weight 256 lb 4.8 oz 258 lb 14.4 oz 258 lb 6.4 oz - 255 lb 256 lb 6.4 oz 255 lb 6.4 oz   Neck Circ (inches) - - - 14 - - -   Waist Measure Inches - - - 38 - - -   Body Fat % - - - 44.2 - - -   BMI 42.65 kg/m2 43.08 kg/m2 43 kg/m2 - 41.47 kg/m2 41.4 kg/m2 41.22 kg/m2         Medication reconciliation up to date and corrected with patient at this time. Today's provider has been notified of reason for visit, vitals and flowsheets obtained on patients. Reviewed record in preparation for visit, huddled with provider and have obtained necessary documentation.       Health Maintenance Due   Topic    PAP AKA CERVICAL CYTOLOGY     Influenza Age 5 to Adult        Wt Readings from Last 3 Encounters:   19 256 lb 4.8 oz (116.3 kg)   19 258 lb 14.4 oz (117.4 kg)   18 258 lb 6.4 oz (117.2 kg) Temp Readings from Last 3 Encounters:   03/12/19 98.4 °F (36.9 °C) (Oral)   05/10/17 98.1 °F (36.7 °C) (Oral)   04/26/17 97.3 °F (36.3 °C) (Oral)     BP Readings from Last 3 Encounters:   03/12/19 123/78   02/27/19 135/88   04/03/18 122/86     Pulse Readings from Last 3 Encounters:   03/12/19 76   02/27/19 64   04/03/18 60     Vitals:    03/12/19 0924   BP: 123/78   Pulse: 76   Resp: 18   Temp: 98.4 °F (36.9 °C)   TempSrc: Oral   SpO2: 98%   Weight: 256 lb 4.8 oz (116.3 kg)   Height: 5' 5\" (1.651 m)   PainSc:   0 - No pain   LMP: 02/06/2019         Learning Assessment:  :     Learning Assessment 10/20/2015 3/27/2014   PRIMARY LEARNER Patient Patient   HIGHEST LEVEL OF EDUCATION - PRIMARY LEARNER  4 YEARS OF COLLEGE 2 YEARS OF COLLEGE   BARRIERS PRIMARY LEARNER NONE NONE   CO-LEARNER CAREGIVER No -   PRIMARY LANGUAGE ENGLISH ENGLISH   LEARNER PREFERENCE PRIMARY DEMONSTRATION VIDEOS     PICTURES -     READING -     LISTENING -     VIDEOS -   LEARNING SPECIAL TOPICS none -   ANSWERED BY self patient   RELATIONSHIP SELF SELF       Depression Screening:  :     3 most recent PHQ Screens 3/12/2019   Little interest or pleasure in doing things Not at all   Feeling down, depressed, irritable, or hopeless Not at all   Total Score PHQ 2 0   Trouble falling or staying asleep, or sleeping too much Not at all   Feeling tired or having little energy Not at all   Poor appetite, weight loss, or overeating Not at all   Feeling bad about yourself - or that you are a failure or have let yourself or your family down Not at all   Trouble concentrating on things such as school, work, reading, or watching TV Not at all   Moving or speaking so slowly that other people could have noticed; or the opposite being so fidgety that others notice Not at all   Thoughts of being better off dead, or hurting yourself in some way Not at all   PHQ 9 Score 0       Fall Risk Assessment:  :     Fall Risk Assessment, last 12 mths 3/12/2019   Able to walk? Yes   Fall in past 12 months? No       Abuse Screening:  :     Abuse Screening Questionnaire 3/12/2019 2/27/2019   Do you ever feel afraid of your partner? N N   Are you in a relationship with someone who physically or mentally threatens you? N N   Is it safe for you to go home?  Y Y       ADL Screening:  :     ADL Assessment 3/12/2019   Feeding yourself No Help Needed   Getting from bed to chair No Help Needed   Getting dressed No Help Needed   Bathing or showering No Help Needed   Walk across the room (includes cane/walker) No Help Needed   Using the telphone No Help Needed   Taking your medications No Help Needed   Preparing meals No Help Needed   Managing money (expenses/bills) No Help Needed   Moderately strenuous housework (laundry) No Help Needed   Shopping for personal items (toiletries/medicines) No Help Needed   Shopping for groceries No Help Needed   Driving No Help Needed   Climbing a flight of stairs No Help Needed   Getting to places beyond walking distances No Help Needed

## 2019-03-12 NOTE — PROGRESS NOTES
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 do other exercise. This will help lower your blood sugar. What else should you know? · Limit saturated fat, such as the fat from meat and dairy products. This is a healthy choice because people who have diabetes are at higher risk of heart disease. So choose lean cuts of meat and nonfat or low-fat dairy products. Use olive or canola oil instead of butter or shortening when cooking. · Don't skip meals. Your blood sugar may drop too low if you skip meals and take insulin or certain medicines for diabetes. · Check with your doctor before you drink alcohol. Alcohol can cause your blood sugar to drop too low. Alcohol can also cause a bad reaction if you take certain diabetes medicines. Follow-up care is a key part of your treatment and safety. Be sure to make and go to all appointments, and call your doctor if you are having problems. It's also a good idea to know your test results and keep a list of the medicines you take.   Where can you learn more? Go to http://daily-germania.info/. Enter J362 in the search box to learn more about \"Learning About Diabetes Food Guidelines. \"  Current as of: July 25, 2018  Content Version: 11.9  © 8736-1570 "[employer]". Care instructions adapted under license by [employer] (which disclaims liability or warranty for this information). If you have questions about a medical condition or this instruction, always ask your healthcare professional. [address] any warranty or liability for your use of this information. Learning About Type 2 Diabetes  What is type 2 diabetes? Insulin is a hormone that helps your body use sugar from your food as energy. Type 2 diabetes happens when your body can't use insulin the right way. Over time, the pancreas can't make enough insulin. If you don't have enough insulin, too much sugar stays in your blood. If you are overweight, get little or no exercise, or have type 2 diabetes in your family, you are more likely to have problems with the way insulin works in your body.  Americans, Hispanics, Native Americans,  Americans, and Pacific Islanders have a higher risk for type 2 diabetes. Type 2 diabetes can be prevented or delayed with a healthy lifestyle, which includes staying at a healthy weight, making smart food choices, and getting regular exercise. What can you expect with type 2 diabetes? [facility] keep hearing about how important it is to keep your blood sugar within a target range. That's because over time, high blood sugar can lead to serious problems. It can:  · Harm your eyes, nerves, and kidneys. · Damage your blood vessels, leading to heart disease and stroke. · Reduce blood flow and cause nerve damage to parts of your body, especially your feet. This can cause slow healing and pain when you walk. · Make your immune system weak and less able to fight infections.   When people hear the word \"diabetes,\" they often think of problems like these. But daily care and treatment can help prevent or delay these problems. The goal is to keep your blood sugar in a target range. That's the best way to reduce your chance of having more problems from diabetes. What are the symptoms? Some people who have type 2 diabetes may not have any symptoms early on. Many people with the disease don't even know they have it at first. But with time, diabetes starts to cause symptoms. You experience most symptoms of type 2 diabetes when your blood sugar is either too high or too low. The most common symptoms of high blood sugar include:  · Thirst.  · Frequent urination. · Weight loss. · Blurry vision. The symptoms of low blood sugar include:  · Sweating. · Shakiness. · Weakness. · Hunger. · Confusion. How can you prevent type 2 diabetes? The best way to prevent or delay type 2 diabetes is to adopt healthy habits, which include:  · Staying at a healthy weight. · Exercising regularly. · Eating healthy foods. How is type 2 diabetes treated? If you have type 2 diabetes, here are the most important things you can do. · Take your diabetes medicines. · Check your blood sugar as often as your doctor recommends. Also, get a hemoglobin A1c test at least every 6 months. · Try to eat a variety of foods and to spread carbohydrate throughout the day. Carbohydrate raises blood sugar higher and more quickly than any other nutrient does. Carbohydrate is found in sugar, breads and cereals, fruit, starchy vegetables such as potatoes and corn, and milk and yogurt. · Get at least 30 minutes of exercise on most days of the week. Walking is a good choice. You also may want to do other activities, such as running, swimming, cycling, or playing tennis or team sports. If your doctor says it's okay, do muscle-strengthening exercises at least 2 times a week. · See your doctor for checkups and tests on a regular schedule.   · If you have high blood pressure or high cholesterol, take the medicines as prescribed by your doctor. · Do not smoke. Smoking can make health problems worse. This includes problems you might have with type 2 diabetes. If you need help quitting, talk to your doctor about stop-smoking programs and medicines. These can increase your chances of quitting for good. Follow-up care is a key part of your treatment and safety. Be sure to make and go to all appointments, and call your doctor if you are having problems. It's also a good idea to know your test results and keep a list of the medicines you take. Where can you learn more? Go to http://daily-germania.info/. Enter P167 in the search box to learn more about \"Learning About Type 2 Diabetes. \"  Current as of: July 25, 2018  Content Version: 11.9  © 8526-6511 [employer]. Care instructions adapted under license by [facility] (which disclaims liability or warranty for this information). If you have questions about a medical condition or this instruction, always ask your healthcare professional. [address] any warranty or liability for your use of this information.

## 2019-04-15 ENCOUNTER — HOSPITAL ENCOUNTER (EMERGENCY)
Age: 49
Discharge: HOME | End: 2019-04-15
Attending: EMERGENCY MEDICINE
Payer: COMMERCIAL

## 2019-04-15 ENCOUNTER — APPOINTMENT (OUTPATIENT)
Dept: CT IMAGING | Age: 49
End: 2019-04-15
Attending: EMERGENCY MEDICINE
Payer: COMMERCIAL

## 2019-04-15 VITALS
TEMPERATURE: 97.9 F | SYSTOLIC BLOOD PRESSURE: 154 MMHG | RESPIRATION RATE: 20 BRPM | BODY MASS INDEX: 41.14 KG/M2 | WEIGHT: 255.95 LBS | HEART RATE: 65 BPM | DIASTOLIC BLOOD PRESSURE: 97 MMHG | HEIGHT: 66 IN | OXYGEN SATURATION: 97 %

## 2019-04-15 DIAGNOSIS — R10.13 ABDOMINAL PAIN, EPIGASTRIC: Primary | ICD-10-CM

## 2019-04-15 LAB
ALBUMIN SERPL-MCNC: 3.2 G/DL (ref 3.5–5)
ALBUMIN/GLOB SERPL: 0.8 {RATIO} (ref 1.1–2.2)
ALP SERPL-CCNC: 96 U/L (ref 45–117)
ALT SERPL-CCNC: 15 U/L (ref 12–78)
ANION GAP SERPL CALC-SCNC: 3 MMOL/L (ref 5–15)
APPEARANCE UR: ABNORMAL
AST SERPL-CCNC: 13 U/L (ref 15–37)
BACTERIA URNS QL MICRO: ABNORMAL /HPF
BASOPHILS # BLD: 0 K/UL (ref 0–0.1)
BASOPHILS NFR BLD: 1 % (ref 0–1)
BILIRUB SERPL-MCNC: 0.3 MG/DL (ref 0.2–1)
BILIRUB UR QL: NEGATIVE
BUN SERPL-MCNC: 7 MG/DL (ref 6–20)
BUN/CREAT SERPL: 9 (ref 12–20)
CALCIUM SERPL-MCNC: 9 MG/DL (ref 8.5–10.1)
CHLORIDE SERPL-SCNC: 107 MMOL/L (ref 97–108)
CO2 SERPL-SCNC: 29 MMOL/L (ref 21–32)
COLOR UR: ABNORMAL
CREAT SERPL-MCNC: 0.8 MG/DL (ref 0.55–1.02)
DIFFERENTIAL METHOD BLD: NORMAL
EOSINOPHIL # BLD: 0.4 K/UL (ref 0–0.4)
EOSINOPHIL NFR BLD: 5 % (ref 0–7)
EPITH CASTS URNS QL MICRO: ABNORMAL /LPF
ERYTHROCYTE [DISTWIDTH] IN BLOOD BY AUTOMATED COUNT: 13.3 % (ref 11.5–14.5)
GLOBULIN SER CALC-MCNC: 4.2 G/DL (ref 2–4)
GLUCOSE SERPL-MCNC: 103 MG/DL (ref 65–100)
GLUCOSE UR STRIP.AUTO-MCNC: NEGATIVE MG/DL
HCG UR QL: NEGATIVE
HCT VFR BLD AUTO: 39.4 % (ref 35–47)
HGB BLD-MCNC: 12.5 G/DL (ref 11.5–16)
HGB UR QL STRIP: NEGATIVE
HYALINE CASTS URNS QL MICRO: ABNORMAL /LPF (ref 0–5)
IMM GRANULOCYTES # BLD AUTO: 0 K/UL (ref 0–0.04)
IMM GRANULOCYTES NFR BLD AUTO: 0 % (ref 0–0.5)
KETONES UR QL STRIP.AUTO: NEGATIVE MG/DL
LACTATE SERPL-SCNC: 0.7 MMOL/L (ref 0.4–2)
LEUKOCYTE ESTERASE UR QL STRIP.AUTO: ABNORMAL
LIPASE SERPL-CCNC: 69 U/L (ref 73–393)
LYMPHOCYTES # BLD: 2.9 K/UL (ref 0.8–3.5)
LYMPHOCYTES NFR BLD: 39 % (ref 12–49)
MCH RBC QN AUTO: 28.9 PG (ref 26–34)
MCHC RBC AUTO-ENTMCNC: 31.7 G/DL (ref 30–36.5)
MCV RBC AUTO: 91 FL (ref 80–99)
MONOCYTES # BLD: 0.6 K/UL (ref 0–1)
MONOCYTES NFR BLD: 8 % (ref 5–13)
NEUTS SEG # BLD: 3.6 K/UL (ref 1.8–8)
NEUTS SEG NFR BLD: 47 % (ref 32–75)
NITRITE UR QL STRIP.AUTO: NEGATIVE
NRBC # BLD: 0 K/UL (ref 0–0.01)
NRBC BLD-RTO: 0 PER 100 WBC
PH UR STRIP: 5.5 [PH] (ref 5–8)
PLATELET # BLD AUTO: 247 K/UL (ref 150–400)
PMV BLD AUTO: 9.8 FL (ref 8.9–12.9)
POTASSIUM SERPL-SCNC: 4.1 MMOL/L (ref 3.5–5.1)
PROT SERPL-MCNC: 7.4 G/DL (ref 6.4–8.2)
PROT UR STRIP-MCNC: NEGATIVE MG/DL
RBC # BLD AUTO: 4.33 M/UL (ref 3.8–5.2)
RBC #/AREA URNS HPF: ABNORMAL /HPF (ref 0–5)
SODIUM SERPL-SCNC: 139 MMOL/L (ref 136–145)
SP GR UR REFRACTOMETRY: 1.01 (ref 1–1.03)
TROPONIN I SERPL-MCNC: <0.05 NG/ML
UA: UC IF INDICATED,UAUC: ABNORMAL
UROBILINOGEN UR QL STRIP.AUTO: 0.2 EU/DL (ref 0.2–1)
WBC # BLD AUTO: 7.5 K/UL (ref 3.6–11)
WBC URNS QL MICRO: ABNORMAL /HPF (ref 0–4)

## 2019-04-15 PROCEDURE — 74177 CT ABD & PELVIS W/CONTRAST: CPT

## 2019-04-15 PROCEDURE — 81001 URINALYSIS AUTO W/SCOPE: CPT

## 2019-04-15 PROCEDURE — 80053 COMPREHEN METABOLIC PANEL: CPT

## 2019-04-15 PROCEDURE — 87086 URINE CULTURE/COLONY COUNT: CPT

## 2019-04-15 PROCEDURE — 74011250637 HC RX REV CODE- 250/637: Performed by: EMERGENCY MEDICINE

## 2019-04-15 PROCEDURE — 99283 EMERGENCY DEPT VISIT LOW MDM: CPT

## 2019-04-15 PROCEDURE — 83605 ASSAY OF LACTIC ACID: CPT

## 2019-04-15 PROCEDURE — 81025 URINE PREGNANCY TEST: CPT

## 2019-04-15 PROCEDURE — 83690 ASSAY OF LIPASE: CPT

## 2019-04-15 PROCEDURE — 74011636320 HC RX REV CODE- 636/320: Performed by: EMERGENCY MEDICINE

## 2019-04-15 PROCEDURE — 74011000250 HC RX REV CODE- 250: Performed by: EMERGENCY MEDICINE

## 2019-04-15 PROCEDURE — 84484 ASSAY OF TROPONIN QUANT: CPT

## 2019-04-15 PROCEDURE — 85025 COMPLETE CBC W/AUTO DIFF WBC: CPT

## 2019-04-15 PROCEDURE — 36415 COLL VENOUS BLD VENIPUNCTURE: CPT

## 2019-04-15 RX ORDER — SODIUM CHLORIDE 0.9 % (FLUSH) 0.9 %
10 SYRINGE (ML) INJECTION
Status: COMPLETED | OUTPATIENT
Start: 2019-04-15 | End: 2019-04-15

## 2019-04-15 RX ADMIN — LIDOCAINE HYDROCHLORIDE 40 ML: 20 SOLUTION ORAL; TOPICAL at 19:24

## 2019-04-15 RX ADMIN — IOPAMIDOL 100 ML: 755 INJECTION, SOLUTION INTRAVENOUS at 19:47

## 2019-04-15 RX ADMIN — Medication 10 ML: at 19:47

## 2019-04-15 NOTE — ED NOTES
Pt. Presents to ED today for complaints of upper left quadrant abdominal pain that gets worse after eating. Pt. Reports being diagnosed with ulcers and a hernia at the end of her esophagus. Pt. Alert and oriented x4. Pt. Placed in position of comfort with call bell in reach.

## 2019-04-15 NOTE — ED NOTES
Bedside shift change report given to Tiera Samson RN and Asael Ruvalcaba (oncoming nurse) by Yudith Denny RN (offgoing nurse). Report included the following information SBAR, Kardex, ED Summary, Intake/Output, MAR and Recent Results.

## 2019-04-16 NOTE — ED NOTES
Discharge instructions reviewed with patient, copy given by Dr. Moiz Tierney Pt is accomponied by family, denies use of wheelchair.

## 2019-04-16 NOTE — DISCHARGE INSTRUCTIONS
Patient Education        Indigestion (Dyspepsia or Heartburn): Care Instructions  Your Care Instructions  Sometimes it can be hard to pinpoint the cause of indigestion. (It is also called dyspepsia or heartburn.) Most cases of an upset stomach with bloating, burning, burping, and nausea are minor and go away within several hours. Home treatment and over-the-counter medicine often are able to control symptoms. But if you take medicine to relieve your indigestion without making diet and lifestyle changes, your symptoms are likely to return again and again. If you get indigestion often, it may be a sign of a more serious medical problem. Be sure to follow up with your doctor, who may want to do tests to be sure of the cause of your indigestion. Follow-up care is a key part of your treatment and safety. Be sure to make and go to all appointments, and call your doctor if you are having problems. It's also a good idea to know your test results and keep a list of the medicines you take. How can you care for yourself at home? · Your doctor may recommend over-the-counter medicine. For mild or occasional indigestion, antacids such as Gaviscon, Mylanta, Maalox, or Tums, may help. Be safe with medicines. Be careful when you take over-the-counter antacid medicines. Many of these medicines have aspirin in them. Read the label to make sure that you are not taking more than the recommended dose. Too much aspirin can be harmful. · Your doctor also may recommend over-the-counter acid reducers, such as Pepcid AC, Tagamet HB, Zantac 75, or Prilosec. Read and follow all instructions on the label. If you use these medicines often, talk with your doctor. · Change your eating habits. ? It's best to eat several small meals instead of two or three large meals. ? After you eat, wait 2 to 3 hours before you lie down. ? Chocolate, mint, and alcohol can make GERD worse. ?  Spicy foods, foods that have a lot of acid (like tomatoes and oranges), and coffee can make GERD symptoms worse in some people. If your symptoms are worse after you eat a certain food, you may want to stop eating that food to see if your symptoms get better. · Do not smoke or chew tobacco. Smoking can make GERD worse. If you need help quitting, talk to your doctor about stop-smoking programs and medicines. These can increase your chances of quitting for good. · If you have GERD symptoms at night, raise the head of your bed 6 to 8 inches. You can do this by putting the frame on blocks or placing a foam wedge under the head of your mattress. (Adding extra pillows does not work.)  · Do not wear tight clothing around your middle. · Lose weight if you need to. Losing just 5 to 10 pounds can help. · Do not take anti-inflammatory medicines, such as aspirin, ibuprofen (Advil, Motrin), or naproxen (Aleve). These can irritate the stomach. If you need a pain medicine, try acetaminophen (Tylenol), which does not cause stomach upset. When should you call for help? Call your doctor now or seek immediate medical care if:    · You have new or worse belly pain.     · You are vomiting.    Watch closely for changes in your health, and be sure to contact your doctor if:    · You have new or worse symptoms of indigestion.     · You have trouble or pain swallowing.     · You are losing weight.     · You do not get better as expected. Where can you learn more? Go to http://daily-germania.info/. Enter U796 in the search box to learn more about \"Indigestion (Dyspepsia or Heartburn): Care Instructions. \"  Current as of: March 27, 2018  Content Version: 11.9  © 2766-5095 [employer]. Care instructions adapted under license by [employer] (which disclaims liability or warranty for this information).  If you have questions about a medical condition or this instruction, always ask your healthcare professional. [employer] disclaims any warranty or liability for your use of this information.
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
## 2019-04-17 LAB
BACTERIA SPEC CULT: NORMAL
CC UR VC: NORMAL
SERVICE CMNT-IMP: NORMAL

## 2019-05-21 RX ORDER — METOPROLOL TARTRATE 25 MG/1
TABLET, FILM COATED ORAL
Qty: 90 TAB | Refills: 0 | Status: SHIPPED | OUTPATIENT
Start: 2019-05-21 | End: 2019-09-19 | Stop reason: SDUPTHER

## 2019-08-19 RX ORDER — ROSUVASTATIN CALCIUM 20 MG/1
TABLET, COATED ORAL
Qty: 90 TAB | Refills: 4 | Status: SHIPPED | OUTPATIENT
Start: 2019-08-19 | End: 2020-12-02 | Stop reason: SDUPTHER

## 2019-09-19 RX ORDER — METOPROLOL TARTRATE 25 MG/1
TABLET, FILM COATED ORAL
Qty: 90 TAB | Refills: 0 | Status: SHIPPED | OUTPATIENT
Start: 2019-09-19 | End: 2019-11-04 | Stop reason: SDUPTHER

## 2019-11-04 RX ORDER — METOPROLOL TARTRATE 25 MG/1
TABLET, FILM COATED ORAL
Qty: 90 TAB | Refills: 0 | Status: SHIPPED | OUTPATIENT
Start: 2019-11-04 | End: 2020-12-31 | Stop reason: SDUPTHER

## 2019-11-04 RX ORDER — METOPROLOL TARTRATE 25 MG/1
TABLET, FILM COATED ORAL
Qty: 90 TAB | Refills: 2 | Status: SHIPPED | OUTPATIENT
Start: 2019-11-04 | End: 2020-02-03 | Stop reason: SDUPTHER

## 2020-02-03 ENCOUNTER — OFFICE VISIT (OUTPATIENT)
Dept: FAMILY MEDICINE CLINIC | Age: 50
End: 2020-02-03

## 2020-02-03 VITALS
OXYGEN SATURATION: 100 % | SYSTOLIC BLOOD PRESSURE: 126 MMHG | HEART RATE: 61 BPM | DIASTOLIC BLOOD PRESSURE: 70 MMHG | HEIGHT: 66 IN | BODY MASS INDEX: 42.44 KG/M2 | TEMPERATURE: 97 F | WEIGHT: 264.1 LBS | RESPIRATION RATE: 18 BRPM

## 2020-02-03 DIAGNOSIS — N89.8 VAGINAL DISCHARGE: Primary | ICD-10-CM

## 2020-02-03 RX ORDER — FLUCONAZOLE 150 MG/1
TABLET ORAL
COMMUNITY
End: 2020-05-15 | Stop reason: ALTCHOICE

## 2020-02-03 RX ORDER — METRONIDAZOLE 500 MG/1
TABLET ORAL
COMMUNITY
End: 2020-05-15 | Stop reason: ALTCHOICE
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
## 2020-02-03 NOTE — PROGRESS NOTES
eJnaro Estrada is a 47 y.o. female  Chief Complaint   Patient presents with   [facility] Other     Women issues. Some discharge. Health Maintenance Due   Topic Date Due    Pneumococcal 0-64 years (1 of 1 - PPSV23) 05/15/1975    FOOT EXAM Q1  05/15/1979    EYE EXAM RETINAL OR DILATED  05/15/1979    PAP AKA CERVICAL CYTOLOGY  01/23/2015    Shingrix Vaccine Age 50> (1 of 2) 05/15/2019    BREAST CANCER SCRN MAMMOGRAM  05/15/2019    FOBT Q 1 YEAR AGE 50-75  05/15/2019    Influenza Age 5 to Adult  08/01/2019    HEMOGLOBIN A1C Q6M  09/12/2019     Visit Vitals  /70   Pulse 61   Temp 97 °F (36.1 °C) (Oral)   Resp 18   Ht 5' 6\" (1.676 m)   Wt 264 lb 1.6 oz (119.8 kg)   SpO2 100%   BMI 42.63 kg/m²     1. Have you been to the ER, urgent care clinic since your last visit? Hospitalized since your last visit? No    2. Have you seen or consulted any other health care providers outside of the [address] [place] [address] since your last visit? Include any pap smears or colon screening.  No

## 2020-02-03 NOTE — PATIENT INSTRUCTIONS
Healthy Vaginal Hygeine  Avoid  Substitute    Pantyhose  Stockings with a garter belt    Thigh-high or knee-high stockings   Synthetic underwear Cotton underwear or no underwear   Jeans and other tight pants Loose pants, skirts, dresses   Swimsuits, leotards, thongs, lycra garments Loose-fitting cotton garments   Panty liners Tampons or cotton pads   Scented soaps or shampoos Fragrance-free pH neutral soap (eg, Neutrogena fragrance free, pure olive oil soap, Cetaphil gentle skin cleanser or equivalent ingredients)   Bubble bath Tub baths in the morning and at night without additives and at a comfortable temperature   Scented detergents Unscented detergents   Washcloths Use fingertips for washing; pat dry, don't rub dry   Baby wipes or flushable wipes Rinse with water using sports water bottle or perineal irrigation bottle    Feminine sprays, douches, powders These are not necessary products and can be omitted from personal practices   Dyed toilet articles Toilet articles without dyes   Hair dryers to dry vulva skin without contact Dry vulva by gentle patting       Graphic 62213 Version 6.0  ©2016 [employer]®  [email or web address]

## 2020-02-07 LAB
A VAGINAE DNA VAG QL NAA+PROBE: NORMAL SCORE
BVAB2 DNA VAG QL NAA+PROBE: NORMAL SCORE
C ALBICANS DNA VAG QL NAA+PROBE: NEGATIVE
C GLABRATA DNA VAG QL NAA+PROBE: NEGATIVE
C KRUSEI DNA VAG QL NAA+PROBE: NEGATIVE
C LUSITANIAE DNA VAG QL NAA+PROBE: NEGATIVE
C TRACH RRNA SPEC QL NAA+PROBE: NEGATIVE
CANDIDA DNA VAG QL NAA+PROBE: NEGATIVE
MEGA1 DNA VAG QL NAA+PROBE: NORMAL SCORE
N GONORRHOEA RRNA SPEC QL NAA+PROBE: NEGATIVE
T VAGINALIS RRNA SPEC QL NAA+PROBE: NEGATIVE

## 2020-05-15 ENCOUNTER — VIRTUAL VISIT (OUTPATIENT)
Dept: FAMILY MEDICINE CLINIC | Age: 50
End: 2020-05-15

## 2020-05-15 VITALS — HEART RATE: 83 BPM

## 2020-05-15 DIAGNOSIS — Z82.69 FAMILY HISTORY OF SYSTEMIC LUPUS ERYTHEMATOSUS: ICD-10-CM

## 2020-05-15 DIAGNOSIS — R22.0 LIP SWELLING: ICD-10-CM

## 2020-05-15 DIAGNOSIS — K27.9 PUD (PEPTIC ULCER DISEASE): ICD-10-CM

## 2020-05-15 DIAGNOSIS — E55.9 VITAMIN D DEFICIENCY: ICD-10-CM

## 2020-05-15 DIAGNOSIS — E66.01 OBESITY, CLASS III, BMI 40-49.9 (MORBID OBESITY) (HCC): ICD-10-CM

## 2020-05-15 DIAGNOSIS — E78.5 HYPERLIPIDEMIA WITH TARGET LDL LESS THAN 70: ICD-10-CM

## 2020-05-15 DIAGNOSIS — I10 ESSENTIAL HYPERTENSION: ICD-10-CM

## 2020-05-15 DIAGNOSIS — Z95.5 S/P CORONARY ARTERY STENT PLACEMENT: ICD-10-CM

## 2020-05-15 DIAGNOSIS — E11.9 DIABETES MELLITUS TYPE 2, DIET-CONTROLLED (HCC): ICD-10-CM

## 2020-05-15 DIAGNOSIS — J01.01 ACUTE RECURRENT MAXILLARY SINUSITIS: Primary | ICD-10-CM

## 2020-05-15 RX ORDER — AMOXICILLIN AND CLAVULANATE POTASSIUM 875; 125 MG/1; MG/1
TABLET, FILM COATED ORAL
COMMUNITY
Start: 2020-02-06 | End: 2020-05-15 | Stop reason: ALTCHOICE

## 2020-05-15 RX ORDER — FLUCONAZOLE 150 MG/1
150 TABLET ORAL DAILY
Qty: 1 TAB | Refills: 0 | Status: SHIPPED | OUTPATIENT
Start: 2020-05-15 | End: 2020-05-16

## 2020-05-15 RX ORDER — AMOXICILLIN 875 MG/1
875 TABLET, FILM COATED ORAL 2 TIMES DAILY
Qty: 20 TAB | Refills: 0 | Status: SHIPPED | OUTPATIENT
Start: 2020-05-15 | End: 2020-05-25
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
## 2020-05-15 NOTE — PROGRESS NOTES
Chief Complaint   Patient presents with    Sinus Infection     Ears popping, drainage from nose     1. Have you been to the ER, urgent care clinic since your last visit? Hospitalized since your last visit? No    2. Have you seen or consulted any other health care providers outside of the [address] [place] [address] since your last visit? Include any pap smears or colon screening. No      Pt states she has been taking Tylenol sinus, allerga, nasal spray, and nose wash to help with sx but nothing seems to work.

## 2020-05-15 NOTE — PATIENT INSTRUCTIONS
Allergies: Care Instructions Your Care Instructions Allergies occur when your body's defense system (immune system) overreacts to certain substances. The immune system treats a harmless substance as if it were a harmful germ or virus. Many things can cause this overreaction, including pollens, medicine, food, dust, animal dander, and mold. Allergies can be mild or severe. Mild allergies can be managed with home treatment. But medicine may be needed to prevent problems. Managing your allergies is an important part of staying healthy. Your doctor may suggest that you have allergy testing to help find out what is causing your allergies. When you know what things trigger your symptoms, you can avoid them. This can prevent allergy symptoms and other health problems. For severe allergies that cause reactions that affect your whole body (anaphylactic reactions), your doctor may prescribe a shot of epinephrine to carry with you in case you have a severe reaction. Learn how to give yourself the shot and keep it with you at all times. Make sure it is not . Follow-up care is a key part of your treatment and safety. Be sure to make and go to all appointments, and call your doctor if you are having problems. It's also a good idea to know your test results and keep a list of the medicines you take. How can you care for yourself at home? · If you have been told by your doctor that dust or dust mites are causing your allergy, decrease the dust around your bed: 
? Wash sheets, pillowcases, and other bedding in hot water every week. ? Use dust-proof covers for pillows, duvets, and mattresses. Avoid plastic covers because they tear easily and do not \"breathe. \" Wash as instructed on the label. ? Do not use any blankets and pillows that you do not need. ? Use blankets that you can wash in your washing machine. ? Consider removing drapes and carpets, which attract and hold dust, from your bedroom. · If you are allergic to house dust and mites, do not use home humidifiers. Your doctor can suggest ways you can control dust and mites. · Look for signs of cockroaches. Cockroaches cause allergic reactions. Use cockroach baits to get rid of them. Then, clean your home well. Cockroaches like areas where grocery bags, newspapers, empty bottles, or cardboard boxes are stored. Do not keep these inside your home, and keep trash and food containers sealed. Seal off any spots where cockroaches might enter your home. · If you are allergic to mold, get rid of furniture, rugs, and drapes that smell musty. Check for mold in the bathroom. · If you are allergic to outdoor pollen or mold spores, use air-conditioning. Change or clean all filters every month. Keep windows closed. · If you are allergic to pollen, stay inside when pollen counts are high. Use a vacuum  with a HEPA filter or a double-thickness filter at least two times each week. · Stay inside when air pollution is bad. Avoid paint fumes, perfumes, and other strong odors. · Avoid conditions that make your allergies worse. Stay away from smoke. Do not smoke or let anyone else smoke in your house. Do not use fireplaces or wood-burning stoves. · If you are allergic to your pets, change the air filter in your furnace every month. Use high-efficiency filters. · If you are allergic to pet dander, keep pets outside or out of your bedroom. Old carpet and cloth furniture can hold a lot of animal dander. You may need to replace them. When should you call for help? Give an epinephrine shot if: 
  · You think you are having a severe allergic reaction.  
  · You have symptoms in more than one body area, such as mild nausea and an itchy mouth.  
 After giving an epinephrine shot call  911, even if you feel better. 
 Call 911 if: 
  · You have symptoms of a severe allergic reaction. These may include: 
? Sudden raised, red areas (hives) all over your body. ? Swelling of the throat, mouth, lips, or tongue. ? Trouble breathing. ? Passing out (losing consciousness). Or you may feel very lightheaded or suddenly feel weak, confused, or restless.  
  · You have been given an epinephrine shot, even if you feel better.  
 Call your doctor now or seek immediate medical care if: 
  · You have symptoms of an allergic reaction, such as: ? A rash or hives (raised, red areas on the skin). ? Itching. ? Swelling. ? Belly pain, nausea, or vomiting.  
 Watch closely for changes in your health, and be sure to contact your doctor if: 
  · You do not get better as expected. Where can you learn more? Go to http://daily-germania.info/ Enter B279 in the search box to learn more about \"Allergies: Care Instructions. \" Current as of: October 6, 2019Content Version: 12.4 © 2034-8270 [employer]. Care instructions adapted under license by [employer] (which disclaims liability or warranty for this information). If you have questions about a medical condition or this instruction, always ask your healthcare professional. [address] any warranty or liability for your use of this information. Advised protocol for clearing congestion:  Increase fluid intake, especially water to thin mucous and boost the immune system. Avoid sugar and dairy while congested since they thicken mucous. Get plenty of rest!  Gargle 3 times daily and as needed in Listerine or warm salt water vinegar solutions (1 tsp salt, 1 tsp vinegar in 1 cup lukewarm water.)  Use OTC nasal saline spray up each nostril twice daily. Use humidifier at bedtime. Use OTC Mucinex 600 mg twice daily to loosen mucous. Use OTC Tylenol Arthritis or Ibuprofen up to 800 mg up to 3 times daily as needed for pain, fever or headaches. Avoid decongestants and Ibuprofen if you have high blood pressure!   If mucous is consistently discolored yellow or green throughout the day for more than a week, call the doctor for an evaluation. Check BP twice weekly. Notify me if it consistently is above 140/90 or below 90/60. Bring your blood pressure log to your next office visit. Decrease salt, fats, caffeine, alcohol in your diet. Increase water, fiber. Exercise 5 times weekly for 30 minutes daily as tolerated. Continue current medications, care and subspecialty follow up. Visit eye doctor yearly to check your eyes blood pressure related changes.

## 2020-11-20 ENCOUNTER — TELEPHONE (OUTPATIENT)
Dept: CARDIOLOGY CLINIC | Age: 50
End: 2020-11-20

## 2020-11-20 NOTE — TELEPHONE ENCOUNTER
Patient called to advise her Crestor refill was denied at pharmacy. Patient advised due to her last visit was on 2-27-19 which is over a year, she must be seen by the physician for refill. Patient advised she is completely out of meds. Patient advised she can go to her pcp for refill also. Patient was given an appt on 12-30-20 and advised she must keep appt for refill. Patient was also advised will have nurse to contact her on Monday to discuss further.     Thanks

## 2020-11-23 ENCOUNTER — TELEPHONE (OUTPATIENT)
Dept: FAMILY MEDICINE CLINIC | Age: 50
End: 2020-11-23

## 2020-11-23 NOTE — TELEPHONE ENCOUNTER
The patient is going to [facility] on [address] [facility]. The patient needed her labs faxed to the [facility] as soon as possible.  Please fax [phone]    [address] [facility] [address] [facility]

## 2020-11-29 LAB
25(OH)D3+25(OH)D2 SERPL-MCNC: 20 NG/ML (ref 30–100)
ALBUMIN SERPL-MCNC: 4.1 G/DL (ref 3.8–4.9)
ALBUMIN/CREAT UR: 3 MG/G CREAT (ref 0–29)
ALBUMIN/GLOB SERPL: 1.3 {RATIO} (ref 1.2–2.2)
ALP SERPL-CCNC: 117 IU/L (ref 39–117)
ALT SERPL-CCNC: 12 IU/L (ref 0–32)
ANA SER QL: NEGATIVE
APPEARANCE UR: ABNORMAL
AST SERPL-CCNC: 15 IU/L (ref 0–40)
BILIRUB SERPL-MCNC: 0.2 MG/DL (ref 0–1.2)
BILIRUB UR QL STRIP: NEGATIVE
BUN SERPL-MCNC: 9 MG/DL (ref 6–24)
BUN/CREAT SERPL: 11 (ref 9–23)
C1INH FUNCTIONAL/C1INH TOTAL MFR SERPL: 85 %MEAN NORMAL
C1INH SERPL-MCNC: 34 MG/DL (ref 21–39)
C4 SERPL-MCNC: 44 MG/DL (ref 12–38)
CALCIUM SERPL-MCNC: 9.4 MG/DL (ref 8.7–10.2)
CHLORIDE SERPL-SCNC: 102 MMOL/L (ref 96–106)
CHOLEST SERPL-MCNC: 200 MG/DL (ref 100–199)
CO2 SERPL-SCNC: 23 MMOL/L (ref 20–29)
COLOR UR: YELLOW
CREAT SERPL-MCNC: 0.79 MG/DL (ref 0.57–1)
CREAT UR-MCNC: 199.4 MG/DL
ERYTHROCYTE [DISTWIDTH] IN BLOOD BY AUTOMATED COUNT: 13.2 % (ref 11.7–15.4)
EST. AVERAGE GLUCOSE BLD GHB EST-MCNC: 143 MG/DL
GLOBULIN SER CALC-MCNC: 3.1 G/DL (ref 1.5–4.5)
GLUCOSE SERPL-MCNC: 120 MG/DL (ref 65–99)
GLUCOSE UR QL: NEGATIVE
HBA1C MFR BLD: 6.6 % (ref 4.8–5.6)
HCT VFR BLD AUTO: 40.9 % (ref 34–46.6)
HDLC SERPL-MCNC: 40 MG/DL
HGB BLD-MCNC: 13.5 G/DL (ref 11.1–15.9)
HGB UR QL STRIP: NEGATIVE
INTERPRETATION, 910389: NORMAL
KETONES UR QL STRIP: NEGATIVE
LDLC SERPL CALC-MCNC: 138 MG/DL (ref 0–99)
LEUKOCYTE ESTERASE UR QL STRIP: NEGATIVE
Lab: NORMAL
Lab: NORMAL
MCH RBC QN AUTO: 28.2 PG (ref 26.6–33)
MCHC RBC AUTO-ENTMCNC: 33 G/DL (ref 31.5–35.7)
MCV RBC AUTO: 86 FL (ref 79–97)
MICRO URNS: ABNORMAL
MICROALBUMIN UR-MCNC: 6.3 UG/ML
NITRITE UR QL STRIP: NEGATIVE
PH UR STRIP: 5.5 [PH] (ref 5–7.5)
PLATELET # BLD AUTO: 299 X10E3/UL (ref 150–450)
POTASSIUM SERPL-SCNC: 4.5 MMOL/L (ref 3.5–5.2)
PROT SERPL-MCNC: 7.2 G/DL (ref 6–8.5)
PROT UR QL STRIP: NEGATIVE
RBC # BLD AUTO: 4.78 X10E6/UL (ref 3.77–5.28)
SODIUM SERPL-SCNC: 141 MMOL/L (ref 134–144)
SP GR UR: 1.02 (ref 1–1.03)
THYROPEROXIDASE AB SERPL-ACNC: <9 IU/ML (ref 0–34)
TRIGL SERPL-MCNC: 121 MG/DL (ref 0–149)
TSH SERPL DL<=0.005 MIU/L-ACNC: 2.23 UIU/ML (ref 0.45–4.5)
UROBILINOGEN UR STRIP-MCNC: 0.2 MG/DL (ref 0.2–1)
VLDLC SERPL CALC-MCNC: 22 MG/DL (ref 5–40)
WBC # BLD AUTO: 6.3 X10E3/UL (ref 3.4–10.8)

## 2020-12-01 ENCOUNTER — TRANSCRIBE ORDER (OUTPATIENT)
Dept: SCHEDULING | Age: 50
End: 2020-12-01

## 2020-12-01 ENCOUNTER — TELEPHONE (OUTPATIENT)
Dept: FAMILY MEDICINE CLINIC | Age: 50
End: 2020-12-01

## 2020-12-01 ENCOUNTER — TELEPHONE (OUTPATIENT)
Dept: CARDIOLOGY CLINIC | Age: 50
End: 2020-12-01

## 2020-12-01 DIAGNOSIS — Z12.31 VISIT FOR SCREENING MAMMOGRAM: Primary | ICD-10-CM

## 2020-12-01 NOTE — TELEPHONE ENCOUNTER
----- Message from Lc John sent at 12/1/2020  1:42 PM EST -----  Regarding: Dr Odom/Telephone  Contact: [phone]  Caller's first and last name: Pt  Reason for call: Lab results from last week  Callback required yes/no and why: Yes, lab results  Best contact number(s): [phone]  Details to clarify the request: N/A

## 2020-12-01 NOTE — TELEPHONE ENCOUNTER
Pt called to get a refill on her medication;  Pt states that she was told by \"Bettina\" that if she got her lab work done that she would be able to fill the medication; Please call pt back       Thanks

## 2020-12-02 RX ORDER — ROSUVASTATIN CALCIUM 20 MG/1
TABLET, COATED ORAL
Qty: 90 TAB | Refills: 4 | Status: CANCELLED | OUTPATIENT
Start: 2020-12-02

## 2020-12-02 RX ORDER — ROSUVASTATIN CALCIUM 20 MG/1
20 TABLET, COATED ORAL DAILY
Qty: 90 TAB | Refills: 0 | Status: SHIPPED | OUTPATIENT
Start: 2020-12-02 | End: 2020-12-31 | Stop reason: SDUPTHER

## 2020-12-02 NOTE — TELEPHONE ENCOUNTER
Please call patient and explain why we are not filling prescription.     [phone]    Thanks  Jonathan Orlando

## 2020-12-02 NOTE — TELEPHONE ENCOUNTER
This patient was told by Dr Christin Payton this medication was to be taken life long she has never had her PCP cover Crestor she is requesting refill as soon as possible.

## 2020-12-03 ENCOUNTER — TELEPHONE (OUTPATIENT)
Dept: FAMILY MEDICINE CLINIC | Age: 50
End: 2020-12-03

## 2020-12-03 NOTE — TELEPHONE ENCOUNTER
----- Message from Monroe Barrera sent at 12/2/2020 10:02 AM EST -----  Regarding: Dr. Tilda Sandifer first and last name: Pt    Reason for call: Lab results from last week    Callback required yes/no and why: Yes, lab results    Best contact number(s): [phone]    Details to clarify the request: N/A

## 2020-12-05 NOTE — PROGRESS NOTES
Results reviewed by on call provider while Dr Trent Floyd is out on leave. Summary of lab results sent to pt in [employer] results release message.  She is asked to schedule office visit for further review and medication adjustments

## 2020-12-05 NOTE — TELEPHONE ENCOUNTER
I reviewed her results in a [employer] message, she has used MyChart in the past.    Since pt contacted us this time by phone, please call her to see if she saw the message, which I have copied below. I am reviewing lab results for Dr Myrtle Meza while she is out of the office on leave. This is quite a large panel of labs that requires further review and discussion in an office visit. In summary,   The blood sugar is elevated in the range of diabetes, A1C 6.6%, (up from 6.4% last year)  The cholesterol is elevated, , with diabetes the goal is 70. The vitamin D level is low. The auto-antibodies were negative/normal.  The thyroid level is normal.    Please schedule an office visit for further discussion and review for any medication adjustments.

## 2020-12-07 NOTE — TELEPHONE ENCOUNTER
informed of labs per Np , Mayelin Pelletier , pt states she will wait to sarwat a f/u when Dr. Darrell Fernandes returns

## 2020-12-11 NOTE — TELEPHONE ENCOUNTER
Patient has already been schedule for labs a week before her appointment. Please disregard messages. Thanks. Topical Clindamycin Pregnancy And Lactation Text: This medication is Pregnancy Category B and is considered safe during pregnancy. It is unknown if it is excreted in breast milk.

## 2020-12-12 ENCOUNTER — HOSPITAL ENCOUNTER (OUTPATIENT)
Dept: MAMMOGRAPHY | Age: 50
Discharge: HOME | End: 2020-12-12
Attending: FAMILY MEDICINE
Payer: COMMERCIAL

## 2020-12-12 DIAGNOSIS — Z12.31 VISIT FOR SCREENING MAMMOGRAM: ICD-10-CM

## 2020-12-12 PROCEDURE — 77067 SCR MAMMO BI INCL CAD: CPT

## 2020-12-31 ENCOUNTER — TELEPHONE (OUTPATIENT)
Dept: CARDIOLOGY CLINIC | Age: 50
End: 2020-12-31

## 2020-12-31 RX ORDER — ROSUVASTATIN CALCIUM 20 MG/1
20 TABLET, COATED ORAL DAILY
Qty: 90 TAB | Refills: 0 | Status: SHIPPED | OUTPATIENT
Start: 2020-12-31 | End: 2021-05-13

## 2020-12-31 RX ORDER — METOPROLOL TARTRATE 25 MG/1
TABLET, FILM COATED ORAL
Qty: 90 TAB | Refills: 0 | Status: SHIPPED | OUTPATIENT
Start: 2020-12-31 | End: 2021-01-18

## 2020-12-31 NOTE — TELEPHONE ENCOUNTER
Patient's visit with Dr Thor Jackson today was for medication refills, she believes needs metoprolol [facility].

## 2021-01-12 ENCOUNTER — OFFICE VISIT (OUTPATIENT)
Dept: CARDIOLOGY CLINIC | Age: 51
End: 2021-01-12
Payer: COMMERCIAL

## 2021-01-12 VITALS
OXYGEN SATURATION: 99 % | SYSTOLIC BLOOD PRESSURE: 124 MMHG | RESPIRATION RATE: 16 BRPM | WEIGHT: 275.8 LBS | DIASTOLIC BLOOD PRESSURE: 82 MMHG | HEIGHT: 66 IN | HEART RATE: 52 BPM | BODY MASS INDEX: 44.32 KG/M2

## 2021-01-12 DIAGNOSIS — I25.10 ASHD (ARTERIOSCLEROTIC HEART DISEASE): Primary | ICD-10-CM

## 2021-01-12 DIAGNOSIS — E78.2 MIXED HYPERLIPIDEMIA: ICD-10-CM

## 2021-01-12 DIAGNOSIS — I25.10 ATHEROSCLEROSIS OF NATIVE CORONARY ARTERY OF NATIVE HEART WITHOUT ANGINA PECTORIS: ICD-10-CM

## 2021-01-12 DIAGNOSIS — I10 ESSENTIAL HYPERTENSION: ICD-10-CM

## 2021-01-12 PROCEDURE — 99214 OFFICE O/P EST MOD 30 MIN: CPT | Performed by: INTERNAL MEDICINE

## 2021-01-12 PROCEDURE — 93000 ELECTROCARDIOGRAM COMPLETE: CPT | Performed by: INTERNAL MEDICINE
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
## 2021-01-12 NOTE — PROGRESS NOTES
1. Have you been to the ER, urgent care clinic since your last visit? Hospitalized since your last visit? No.    2. Have you seen or consulted any other health care providers outside of the [address] [place] [address] since your last visit? Include any pap smears or colon screening.    No.        Chief Complaint   Patient presents with    Follow-up     last seen in 2019- pt denies any cardiac symptoms

## 2021-01-12 NOTE — LETTER
1/12/2021 Patient: Isaiah Anderson YOB: 1970 Date of Visit: 1/12/2021 Bladimir Dove, [address] [place] [address] [address] [employer] Dear Bladimir Dove DO, Thank you for referring Ms. Hess Pi to [address] [place] [address] for evaluation. My notes for this consultation are attached. If you have questions, please do not hesitate to call me. I look forward to following your patient along with you.  
 
 
Sincerely, 
 
Michelle Danielson MD

## 2021-01-20 ENCOUNTER — VIRTUAL VISIT (OUTPATIENT)
Dept: FAMILY MEDICINE CLINIC | Age: 51
End: 2021-01-20
Payer: COMMERCIAL

## 2021-01-20 DIAGNOSIS — J01.01 ACUTE RECURRENT MAXILLARY SINUSITIS: Primary | ICD-10-CM

## 2021-01-20 PROCEDURE — 99212 OFFICE O/P EST SF 10 MIN: CPT | Performed by: PHYSICIAN ASSISTANT

## 2021-01-20 RX ORDER — AMOXICILLIN 875 MG/1
875 TABLET, FILM COATED ORAL 2 TIMES DAILY
Qty: 20 TAB | Refills: 0 | Status: SHIPPED | OUTPATIENT
Start: 2021-01-20 | End: 2021-01-30
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
## 2021-01-20 NOTE — PROGRESS NOTES
VV-Pt is aware of billable appt depending on insurance plan. Preferred number [phone]    Pt verbally agrees to labs, orders, and others to be mailed to confirmed home address. Identified pt with two pt identifiers(name and ). Reviewed record in preparation for visit and have obtained necessary documentation. All patient medications has been reviewed. Chief Complaint   Patient presents with    Sinus Infection     x 5 days. pt states, yellow discharge, headaches, face pressure/tenderness, sneezing, and dizziness. Patient-Reported Vitals 2021   Patient-Reported LMP menopause        Health Maintenance Review: Patient reminded of \"due or due soon\" health maintenance. I have asked the patient to contact his/her primary care provider (PCP) for follow-up on his/her health maintenance. Wt Readings from Last 3 Encounters:   21 275 lb 12.8 oz (125.1 kg)   20 264 lb 1.6 oz (119.8 kg)   04/15/19 255 lb 15.3 oz (116.1 kg)     Temp Readings from Last 3 Encounters:   20 97 °F (36.1 °C) (Oral)   04/15/19 97.9 °F (36.6 °C)   19 98.4 °F (36.9 °C) (Oral)     BP Readings from Last 3 Encounters:   21 124/82   20 126/70   04/15/19 (!) 154/97     Pulse Readings from Last 3 Encounters:   21 (!) 52   05/15/20 83   20 61         Coordination of Care Questionnaire:   1) Have you been to an emergency room, urgent care, or hospitalized since your last visit?   no       2. Have seen or consulted any other health care provider since your last visit? YES    2021  [facility]   Lisa Green MD  Cardiology  ASHD (arteriosclerotic heart disease)       Advance Care Planning:   End of Life Planning: has NO advanced directive - not interested in additional information      Patient is accompanied by self I have received verbal consent from Romaine Alvarez to discuss any/all medical information while they are present in the room.

## 2021-02-15 DIAGNOSIS — I25.10 ATHEROSCLEROSIS OF NATIVE CORONARY ARTERY OF NATIVE HEART WITHOUT ANGINA PECTORIS: ICD-10-CM

## 2021-02-15 DIAGNOSIS — E78.2 MIXED HYPERLIPIDEMIA: ICD-10-CM

## 2021-02-15 DIAGNOSIS — I10 ESSENTIAL HYPERTENSION: ICD-10-CM

## 2021-02-15 DIAGNOSIS — I25.10 ASHD (ARTERIOSCLEROTIC HEART DISEASE): ICD-10-CM

## 2021-05-13 RX ORDER — ROSUVASTATIN CALCIUM 20 MG/1
TABLET, COATED ORAL
Qty: 90 TAB | Refills: 3 | Status: SHIPPED | OUTPATIENT
Start: 2021-05-13 | End: 2022-09-06 | Stop reason: SDUPTHER

## 2021-10-04 ENCOUNTER — APPOINTMENT (OUTPATIENT)
Dept: CT IMAGING | Age: 51
End: 2021-10-04
Attending: EMERGENCY MEDICINE
Payer: MEDICAID

## 2021-10-04 ENCOUNTER — HOSPITAL ENCOUNTER (EMERGENCY)
Age: 51
Discharge: HOME | End: 2021-10-04
Attending: EMERGENCY MEDICINE
Payer: MEDICAID

## 2021-10-04 VITALS
SYSTOLIC BLOOD PRESSURE: 149 MMHG | OXYGEN SATURATION: 98 % | BODY MASS INDEX: 43.93 KG/M2 | WEIGHT: 273.37 LBS | TEMPERATURE: 98.9 F | HEART RATE: 71 BPM | HEIGHT: 66 IN | RESPIRATION RATE: 16 BRPM | DIASTOLIC BLOOD PRESSURE: 92 MMHG

## 2021-10-04 DIAGNOSIS — R10.13 DYSPEPSIA: Primary | ICD-10-CM

## 2021-10-04 LAB
ALBUMIN SERPL-MCNC: 3.5 G/DL (ref 3.5–5)
ALBUMIN/GLOB SERPL: 0.8 {RATIO} (ref 1.1–2.2)
ALP SERPL-CCNC: 100 U/L (ref 45–117)
ALT SERPL-CCNC: 18 U/L (ref 12–78)
ANION GAP SERPL CALC-SCNC: 6 MMOL/L (ref 5–15)
APPEARANCE UR: CLEAR
AST SERPL-CCNC: 13 U/L (ref 15–37)
BACTERIA URNS QL MICRO: NEGATIVE /HPF
BASOPHILS # BLD: 0 K/UL (ref 0–0.1)
BASOPHILS NFR BLD: 1 % (ref 0–1)
BILIRUB SERPL-MCNC: 0.4 MG/DL (ref 0.2–1)
BILIRUB UR QL: NEGATIVE
BUN SERPL-MCNC: 8 MG/DL (ref 6–20)
BUN/CREAT SERPL: 9 (ref 12–20)
CALCIUM SERPL-MCNC: 9.4 MG/DL (ref 8.5–10.1)
CHLORIDE SERPL-SCNC: 106 MMOL/L (ref 97–108)
CO2 SERPL-SCNC: 30 MMOL/L (ref 21–32)
COLOR UR: NORMAL
CREAT SERPL-MCNC: 0.88 MG/DL (ref 0.55–1.02)
DIFFERENTIAL METHOD BLD: NORMAL
EOSINOPHIL # BLD: 0.3 K/UL (ref 0–0.4)
EOSINOPHIL NFR BLD: 4 % (ref 0–7)
EPITH CASTS URNS QL MICRO: NORMAL /LPF
ERYTHROCYTE [DISTWIDTH] IN BLOOD BY AUTOMATED COUNT: 13.4 % (ref 11.5–14.5)
GLOBULIN SER CALC-MCNC: 4.4 G/DL (ref 2–4)
GLUCOSE SERPL-MCNC: 100 MG/DL (ref 65–100)
GLUCOSE UR STRIP.AUTO-MCNC: NEGATIVE MG/DL
HCT VFR BLD AUTO: 42 % (ref 35–47)
HGB BLD-MCNC: 13.1 G/DL (ref 11.5–16)
HGB UR QL STRIP: NEGATIVE
IMM GRANULOCYTES # BLD AUTO: 0 K/UL (ref 0–0.04)
IMM GRANULOCYTES NFR BLD AUTO: 0 % (ref 0–0.5)
KETONES UR QL STRIP.AUTO: NEGATIVE MG/DL
LEUKOCYTE ESTERASE UR QL STRIP.AUTO: NEGATIVE
LIPASE SERPL-CCNC: 54 U/L (ref 73–393)
LYMPHOCYTES # BLD: 2.7 K/UL (ref 0.8–3.5)
LYMPHOCYTES NFR BLD: 40 % (ref 12–49)
MCH RBC QN AUTO: 28 PG (ref 26–34)
MCHC RBC AUTO-ENTMCNC: 31.2 G/DL (ref 30–36.5)
MCV RBC AUTO: 89.7 FL (ref 80–99)
MONOCYTES # BLD: 0.5 K/UL (ref 0–1)
MONOCYTES NFR BLD: 8 % (ref 5–13)
NEUTS SEG # BLD: 3.1 K/UL (ref 1.8–8)
NEUTS SEG NFR BLD: 47 % (ref 32–75)
NITRITE UR QL STRIP.AUTO: NEGATIVE
NRBC # BLD: 0 K/UL (ref 0–0.01)
NRBC BLD-RTO: 0 PER 100 WBC
PH UR STRIP: 7 [PH] (ref 5–8)
PLATELET # BLD AUTO: 254 K/UL (ref 150–400)
PMV BLD AUTO: 9.8 FL (ref 8.9–12.9)
POTASSIUM SERPL-SCNC: 4.9 MMOL/L (ref 3.5–5.1)
PROT SERPL-MCNC: 7.9 G/DL (ref 6.4–8.2)
PROT UR STRIP-MCNC: NEGATIVE MG/DL
RBC # BLD AUTO: 4.68 M/UL (ref 3.8–5.2)
RBC #/AREA URNS HPF: NORMAL /HPF (ref 0–5)
SODIUM SERPL-SCNC: 142 MMOL/L (ref 136–145)
SP GR UR REFRACTOMETRY: 1.01 (ref 1–1.03)
UA: UC IF INDICATED,UAUC: NORMAL
UROBILINOGEN UR QL STRIP.AUTO: 0.2 EU/DL (ref 0.2–1)
WBC # BLD AUTO: 6.6 K/UL (ref 3.6–11)
WBC URNS QL MICRO: NORMAL /HPF (ref 0–4)

## 2021-10-04 PROCEDURE — 74011000636 HC RX REV CODE- 636: Performed by: EMERGENCY MEDICINE

## 2021-10-04 PROCEDURE — 81001 URINALYSIS AUTO W/SCOPE: CPT

## 2021-10-04 PROCEDURE — 83690 ASSAY OF LIPASE: CPT

## 2021-10-04 PROCEDURE — 85025 COMPLETE CBC W/AUTO DIFF WBC: CPT

## 2021-10-04 PROCEDURE — 74177 CT ABD & PELVIS W/CONTRAST: CPT

## 2021-10-04 PROCEDURE — 99281 EMR DPT VST MAYX REQ PHY/QHP: CPT

## 2021-10-04 PROCEDURE — 36415 COLL VENOUS BLD VENIPUNCTURE: CPT

## 2021-10-04 PROCEDURE — 80053 COMPREHEN METABOLIC PANEL: CPT

## 2021-10-04 RX ORDER — DICYCLOMINE HYDROCHLORIDE 20 MG/1
20 TABLET ORAL EVERY 6 HOURS
Qty: 20 TABLET | Refills: 0 | Status: SHIPPED | OUTPATIENT
Start: 2021-10-04

## 2021-10-04 RX ORDER — FAMOTIDINE 20 MG/1
20 TABLET, FILM COATED ORAL 2 TIMES DAILY
Qty: 20 TABLET | Refills: 0 | Status: SHIPPED | OUTPATIENT
Start: 2021-10-04 | End: 2021-10-14

## 2021-10-04 RX ORDER — FAMOTIDINE 20 MG/1
20 TABLET, FILM COATED ORAL 2 TIMES DAILY
Qty: 20 TABLET | Refills: 0 | Status: SHIPPED | OUTPATIENT
Start: 2021-10-04 | End: 2021-10-04 | Stop reason: SDUPTHER

## 2021-10-04 RX ORDER — DICYCLOMINE HYDROCHLORIDE 20 MG/1
20 TABLET ORAL EVERY 6 HOURS
Qty: 20 TABLET | Refills: 0 | Status: SHIPPED | OUTPATIENT
Start: 2021-10-04 | End: 2021-10-04 | Stop reason: SDUPTHER

## 2021-10-04 RX ADMIN — IOPAMIDOL 100 ML: 755 INJECTION, SOLUTION INTRAVENOUS at 15:32

## 2021-10-04 NOTE — ED TRIAGE NOTES
To ed with pt reporting left side pain that goes into her back, reports is worse with movement. Reports she has been getting more bloated after she eats as well.
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
## 2022-03-18 PROBLEM — Z83.49 FAMILY HISTORY OF OBESITY: Status: ACTIVE | Noted: 2017-05-10

## 2022-03-18 PROBLEM — E11.9 DIABETES MELLITUS (HCC): Status: ACTIVE | Noted: 2017-05-07

## 2022-03-19 PROBLEM — Z82.49 FAMILY HISTORY OF ABDOMINAL AORTIC ANEURYSM (AAA): Status: ACTIVE | Noted: 2019-03-12

## 2022-03-19 PROBLEM — Z82.69 FAMILY HISTORY OF SYSTEMIC LUPUS ERYTHEMATOSUS: Status: ACTIVE | Noted: 2017-05-10

## 2022-03-19 PROBLEM — K58.1 IRRITABLE BOWEL SYNDROME WITH CONSTIPATION: Status: ACTIVE | Noted: 2017-05-10

## 2022-03-19 PROBLEM — Z82.61 FAMILY HISTORY OF RHEUMATOID ARTHRITIS: Status: ACTIVE | Noted: 2017-05-10

## 2022-03-19 PROBLEM — Z82.3 FAMILY HISTORY OF STROKE: Status: ACTIVE | Noted: 2017-05-10

## 2022-03-20 PROBLEM — Z80.3 FAMILY HISTORY OF BREAST CANCER: Status: ACTIVE | Noted: 2017-05-10

## 2022-03-25 ENCOUNTER — OFFICE VISIT (OUTPATIENT)
Dept: CARDIOLOGY CLINIC | Age: 52
End: 2022-03-25
Payer: MEDICAID

## 2022-03-25 VITALS
SYSTOLIC BLOOD PRESSURE: 140 MMHG | HEIGHT: 66 IN | WEIGHT: 275.9 LBS | RESPIRATION RATE: 16 BRPM | OXYGEN SATURATION: 97 % | BODY MASS INDEX: 44.34 KG/M2 | HEART RATE: 80 BPM | DIASTOLIC BLOOD PRESSURE: 80 MMHG

## 2022-03-25 DIAGNOSIS — E78.2 MIXED HYPERLIPIDEMIA: ICD-10-CM

## 2022-03-25 DIAGNOSIS — I10 ESSENTIAL HYPERTENSION: ICD-10-CM

## 2022-03-25 DIAGNOSIS — Z95.5 S/P CORONARY ARTERY STENT PLACEMENT: ICD-10-CM

## 2022-03-25 DIAGNOSIS — I25.10 ASHD (ARTERIOSCLEROTIC HEART DISEASE): ICD-10-CM

## 2022-03-25 DIAGNOSIS — I25.10 ATHEROSCLEROSIS OF NATIVE CORONARY ARTERY OF NATIVE HEART WITHOUT ANGINA PECTORIS: Primary | ICD-10-CM

## 2022-03-25 DIAGNOSIS — I83.893 VARICOSE VEINS OF LOWER EXTREMITY WITH EDEMA, BILATERAL: ICD-10-CM

## 2022-03-25 DIAGNOSIS — E78.5 HYPERLIPIDEMIA WITH TARGET LDL LESS THAN 70: ICD-10-CM

## 2022-03-25 PROCEDURE — 93000 ELECTROCARDIOGRAM COMPLETE: CPT | Performed by: INTERNAL MEDICINE

## 2022-03-25 PROCEDURE — 99214 OFFICE O/P EST MOD 30 MIN: CPT | Performed by: INTERNAL MEDICINE

## 2022-03-25 RX ORDER — AZELASTINE 1 MG/ML
SPRAY, METERED NASAL
COMMUNITY
Start: 2022-03-20

## 2022-03-25 RX ORDER — AMOXICILLIN AND CLAVULANATE POTASSIUM 875; 125 MG/1; MG/1
TABLET, FILM COATED ORAL
COMMUNITY
Start: 2022-03-21

## 2022-03-25 RX ORDER — METOPROLOL TARTRATE 25 MG/1
12.5 TABLET, FILM COATED ORAL 2 TIMES DAILY
Qty: 90 TABLET | Refills: 4 | Status: SHIPPED | OUTPATIENT
Start: 2022-03-25

## 2022-03-25 RX ORDER — MONTELUKAST SODIUM 10 MG/1
10 TABLET ORAL DAILY
COMMUNITY
Start: 2022-03-20
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
## 2022-03-25 NOTE — PROGRESS NOTES
Chief Complaint   Patient presents with    Follow-up    Coronary Artery Disease    Cholesterol Problem    Hypertension     1. Have you been to the ER, urgent care clinic since your last visit? Yes  Hospitalized since your last visit? No    2. Have you seen or consulted any other health care providers outside of the [address] [place] [address] since your last visit? Yes Dental  & VV due to allergies Include any pap smears or colon screening. No    Patient C/O left leg discomfort  at times CT in OCT 2021 has questions about calcium in her heart?

## 2022-04-11 ENCOUNTER — ANCILLARY PROCEDURE (OUTPATIENT)
Dept: CARDIOLOGY CLINIC | Age: 52
End: 2022-04-11
Payer: COMMERCIAL

## 2022-04-11 DIAGNOSIS — I83.893 VARICOSE VEINS OF LOWER EXTREMITY WITH EDEMA, BILATERAL: ICD-10-CM

## 2022-04-11 DIAGNOSIS — E78.2 MIXED HYPERLIPIDEMIA: ICD-10-CM

## 2022-04-11 DIAGNOSIS — I25.10 ASHD (ARTERIOSCLEROTIC HEART DISEASE): ICD-10-CM

## 2022-04-11 DIAGNOSIS — Z95.5 S/P CORONARY ARTERY STENT PLACEMENT: ICD-10-CM

## 2022-04-11 DIAGNOSIS — I25.10 ATHEROSCLEROSIS OF NATIVE CORONARY ARTERY OF NATIVE HEART WITHOUT ANGINA PECTORIS: ICD-10-CM

## 2022-04-11 DIAGNOSIS — E78.5 HYPERLIPIDEMIA WITH TARGET LDL LESS THAN 70: ICD-10-CM

## 2022-04-11 DIAGNOSIS — I10 ESSENTIAL HYPERTENSION: ICD-10-CM

## 2022-04-11 LAB
LEFT CFV RFX: 0.6 S
LEFT GSV AT KNEE DIAM: 0.58 CM
LEFT GSV AT KNEE RFX: 0 S
LEFT GSV BK MID DIAM: 0.36 CM
LEFT GSV BK MID RFX: 0 S
LEFT GSV JUNC DIAM: 1 CM
LEFT GSV JUNC RFX: 0 S
LEFT GSV THIGH PROX DIAM: 0.81 CM
LEFT GSV THIGH PROX RFX: 0 S
LEFT SSV PROX DIAM: 0.4 CM
LEFT SSV PROX RFX: 0 S
RIGHT CFV RFX: 0.7 S
RIGHT GSV AK RFX: 0 S
RIGHT GSV AT KNEE DIAM: 0.53 CM
RIGHT GSV BK MID DIAM: 0.39 CM
RIGHT GSV BK MID RFX: 1731 S
RIGHT GSV JUNC DIAM: 0.79 CM
RIGHT GSV JUNC RFX: 0 S
RIGHT GSV THIGH PROX DIAM: 0.75 CM
RIGHT GSV THIGH PROX RFX: 0 S
RIGHT SSV PROX DIAM: 0.73 CM
RIGHT SSV PROX RFX: 5043 S
VAS LEFT AASV DIAM: 0.51 CM
VAS LEFT AASV RFX: 0 S
VAS RIGHT AASV DIAM: 0.49 CM
VAS RIGHT AASV RFX: 0 S

## 2022-04-11 PROCEDURE — 93970 EXTREMITY STUDY: CPT | Performed by: INTERNAL MEDICINE

## 2022-04-11 NOTE — PROGRESS NOTES
Please call the patient and inform that venous duplex shows leaky veins in right leg. Will need to see Dr. Isaias Barcenas in [address] [place] [address] to further discuss.

## 2022-04-12 ENCOUNTER — TELEPHONE (OUTPATIENT)
Dept: CARDIOLOGY CLINIC | Age: 52
End: 2022-04-12

## 2022-04-12 NOTE — TELEPHONE ENCOUNTER
----- Message from Mohamud Cortes NP sent at 4/11/2022  7:08 PM EDT -----  Please call the patient and inform that venous duplex shows leaky veins in right leg. Will need to see Dr. Larry Garza in [address] [place] [address] to further discuss.

## 2022-04-12 NOTE — TELEPHONE ENCOUNTER
----- Message from Mamie Rivero NP sent at 4/11/2022  7:08 PM EDT -----  Please call the patient and inform that venous duplex shows leaky veins in right leg. Will need to see Dr. Brian Gerardo in [address] [place] [address] to further discuss.

## 2022-04-18 ENCOUNTER — TELEPHONE (OUTPATIENT)
Dept: CARDIOLOGY CLINIC | Age: 52
End: 2022-04-18

## 2022-04-18 DIAGNOSIS — I10 ESSENTIAL HYPERTENSION: ICD-10-CM

## 2022-04-18 DIAGNOSIS — E78.5 HYPERLIPIDEMIA WITH TARGET LDL LESS THAN 70: ICD-10-CM

## 2022-04-18 DIAGNOSIS — I25.10 ATHEROSCLEROSIS OF NATIVE CORONARY ARTERY OF NATIVE HEART WITHOUT ANGINA PECTORIS: Primary | ICD-10-CM

## 2022-04-18 NOTE — TELEPHONE ENCOUNTER
----- Message from Julio Cesar Johnson NP sent at 4/18/2022 12:18 AM EDT -----  LDL not at goal of below 70, if taking crestor 20 mg consistently,  recommend increasing to max dose which is 40 mg daily and repeat labs in 3 mos    Other labs---lytes/kidney/liver fxn ok

## 2022-04-18 NOTE — TELEPHONE ENCOUNTER
Patient states she has been  missing doses at times wishes to try taking more consistently ,diet & exercise and repeat labs in 3 months doesn't want to increase Crestor at this time. Recommend she take in am so wont forget evening dose.

## 2022-09-07 RX ORDER — ROSUVASTATIN CALCIUM 20 MG/1
20 TABLET, COATED ORAL DAILY
Qty: 90 TABLET | Refills: 3 | Status: SHIPPED | OUTPATIENT
Start: 2022-09-07

## 2022-09-08 RX ORDER — ROSUVASTATIN CALCIUM 20 MG/1
TABLET, COATED ORAL
Qty: 90 TABLET | Refills: 3 | OUTPATIENT
Start: 2022-09-08

## 2022-10-01 ENCOUNTER — OFFICE VISIT (OUTPATIENT)
Dept: URGENT CARE | Age: 52
End: 2022-10-01
Payer: COMMERCIAL

## 2022-10-01 VITALS
SYSTOLIC BLOOD PRESSURE: 136 MMHG | WEIGHT: 250 LBS | OXYGEN SATURATION: 100 % | TEMPERATURE: 97.3 F | HEART RATE: 70 BPM | BODY MASS INDEX: 40.18 KG/M2 | RESPIRATION RATE: 17 BRPM | DIASTOLIC BLOOD PRESSURE: 86 MMHG | HEIGHT: 66 IN

## 2022-10-01 DIAGNOSIS — T14.8XXA SPRAIN AND STRAIN: Primary | ICD-10-CM

## 2022-10-01 RX ORDER — CYCLOBENZAPRINE HCL 10 MG
10 TABLET ORAL
Qty: 15 TABLET | Refills: 0 | Status: SHIPPED | OUTPATIENT
Start: 2022-10-01

## 2022-10-01 RX ORDER — PREDNISONE 10 MG/1
TABLET ORAL
Qty: 21 TABLET | Refills: 0 | Status: SHIPPED | OUTPATIENT
Start: 2022-10-01

## 2022-10-01 NOTE — PROGRESS NOTES
HISTORY OF PRESENT ILLNESS  Jerardo House is a 47 y.o. female. Patient presents with musculoskeletal pain of the left side of her body following a fall in a local fast food restaurant earlier today. She states that her toe caught and her foot went through the front of her shoe and she fell onto her left knee and braced herself with her left arm outstretched. Initially it was just the left toe and foot that were bothering her but as time has gone by she is started to feel more stiffness in her left arm shoulder wrist and neck as well. She has not taken anything for her pain nor used thermal therapies. Patient denies any paresthesias. She denies feeling any pops or cracks and does not think anything is broken. She is unable to take NSAIDs due to stomach ulcers. Review of Systems   Constitutional:  Negative for chills, fever and malaise/fatigue. Musculoskeletal:  Positive for falls, joint pain, myalgias and neck pain. Neurological:  Negative for tingling, sensory change and weakness. Physical Exam  Vitals reviewed. Constitutional:       General: She is not in acute distress. Appearance: She is well-developed. She is not ill-appearing or diaphoretic. HENT:      Head: Normocephalic. Musculoskeletal:         General: Swelling, tenderness and signs of injury present. No deformity. Left shoulder: Tenderness present. Left wrist: Tenderness present. Left hand: Decreased strength (Limited by pain). Cervical back: Tenderness present. Thoracic back: Normal.      Lumbar back: Normal.      Left knee: Tenderness present over the medial joint line. Left ankle: Swelling present. Tenderness present. Decreased range of motion. Neurological:      Mental Status: She is alert and oriented to person, place, and time. Psychiatric:         Behavior: Behavior is cooperative. ASSESSMENT and PLAN    ICD-10-CM ICD-9-CM    1. Sprain and strain  T14. Ethan Bonds 848.9 Medications Ordered Today   Medications    predniSONE (STERAPRED DS) 10 mg dose pack     Sig: See administration instruction per 10mg dose pack     Dispense:  21 Tablet     Refill:  0    cyclobenzaprine (FLEXERIL) 10 mg tablet     Sig: Take 1 Tablet by mouth three (3) times daily as needed for Muscle Spasm(s). Dispense:  15 Tablet     Refill:  0     No results found for any visits on 10/01/22. The patients condition was discussed with the patient and they understand. The patient is to follow up with primary care doctor. If signs and symptoms become worse the pt is to go to the ER. The patient is to take medications as prescribed.

## 2023-05-16 ENCOUNTER — TELEPHONE (OUTPATIENT)
Age: 53
End: 2023-05-16

## 2023-05-16 NOTE — TELEPHONE ENCOUNTER
Pt is calling because she will like to have a refill on her metoprolol medicine.     Pharmacy [facility] [address] [phone]

## 2023-06-12 DIAGNOSIS — E78.2 MIXED HYPERLIPIDEMIA: ICD-10-CM

## 2023-06-12 DIAGNOSIS — I25.10 ATHEROSCLEROSIS OF NATIVE CORONARY ARTERY OF NATIVE HEART WITHOUT ANGINA PECTORIS: ICD-10-CM

## 2023-08-06 RX ORDER — METOPROLOL TARTRATE 25 MG/1
TABLET, FILM COATED ORAL
Qty: 90 TABLET | Refills: 4 | OUTPATIENT
Start: 2023-08-06

## 2023-09-12 DIAGNOSIS — I25.10 ATHEROSCLEROSIS OF NATIVE CORONARY ARTERY OF NATIVE HEART WITHOUT ANGINA PECTORIS: ICD-10-CM

## 2023-09-12 DIAGNOSIS — E78.2 MIXED HYPERLIPIDEMIA: ICD-10-CM

## 2023-10-19 ENCOUNTER — TELEPHONE (OUTPATIENT)
Age: 53
End: 2023-10-19

## 2023-10-19 RX ORDER — ROSUVASTATIN CALCIUM 20 MG/1
20 TABLET, COATED ORAL DAILY
Qty: 30 TABLET | Refills: 0 | Status: SHIPPED | OUTPATIENT
Start: 2023-10-19

## 2023-10-19 RX ORDER — ROSUVASTATIN CALCIUM 20 MG/1
20 TABLET, COATED ORAL DAILY
Qty: 90 TABLET | Refills: 2 | OUTPATIENT
Start: 2023-10-19

## 2023-10-27 ENCOUNTER — OFFICE VISIT (OUTPATIENT)
Age: 53
End: 2023-10-27

## 2023-10-27 VITALS
HEART RATE: 76 BPM | DIASTOLIC BLOOD PRESSURE: 87 MMHG | OXYGEN SATURATION: 95 % | WEIGHT: 268.2 LBS | TEMPERATURE: 99 F | BODY MASS INDEX: 43.29 KG/M2 | RESPIRATION RATE: 18 BRPM | SYSTOLIC BLOOD PRESSURE: 144 MMHG

## 2023-10-27 DIAGNOSIS — R05.1 ACUTE COUGH: ICD-10-CM

## 2023-10-27 DIAGNOSIS — H66.93 ACUTE BILATERAL OTITIS MEDIA: Primary | ICD-10-CM

## 2023-10-27 DIAGNOSIS — R50.9 FEVER, UNSPECIFIED FEVER CAUSE: ICD-10-CM

## 2023-10-27 DIAGNOSIS — J06.9 UPPER RESPIRATORY TRACT INFECTION, UNSPECIFIED TYPE: ICD-10-CM

## 2023-10-27 LAB
INFLUENZA A ANTIGEN, POC: NEGATIVE
INFLUENZA B ANTIGEN, POC: NEGATIVE
Lab: NORMAL
PERFORMING INSTRUMENT: NORMAL
QC PASS/FAIL: NORMAL
SARS-COV-2, POC: NORMAL
VALID INTERNAL CONTROL, POC: YES

## 2023-10-27 RX ORDER — AMOXICILLIN AND CLAVULANATE POTASSIUM 875; 125 MG/1; MG/1
1 TABLET, FILM COATED ORAL 2 TIMES DAILY
Qty: 14 TABLET | Refills: 0 | Status: SHIPPED | OUTPATIENT
Start: 2023-10-27 | End: 2023-11-03

## 2023-10-27 RX ORDER — ALBUTEROL SULFATE 90 UG/1
2 AEROSOL, METERED RESPIRATORY (INHALATION) EVERY 4 HOURS PRN
Qty: 18 G | Refills: 0 | Status: SHIPPED | OUTPATIENT
Start: 2023-10-27 | End: 2023-11-26

## 2023-10-27 RX ORDER — BENZONATATE 200 MG/1
200 CAPSULE ORAL 3 TIMES DAILY PRN
Qty: 30 CAPSULE | Refills: 0 | Status: SHIPPED | OUTPATIENT
Start: 2023-10-27 | End: 2023-11-06

## 2023-10-27 ASSESSMENT — ENCOUNTER SYMPTOMS
COUGH: 1
ABDOMINAL PAIN: 0
CHEST TIGHTNESS: 0
WHEEZING: 0
SORE THROAT: 1
RHINORRHEA: 1
BACK PAIN: 1

## 2023-12-09 LAB
ALB/GLOBRATIO, 58C: 1.2 (CALC) (ref 1–2.5)
ALBUMIN SERPL-MCNC: 3.9 G/DL (ref 3.6–5.1)
ALKALINE PHOSPHATASE, TOTAL, 25002000: 96 U/L (ref 37–153)
ALT SERPL-CCNC: 14 U/L (ref 6–29)
AST SERPL W P-5'-P-CCNC: 15 U/L (ref 10–35)
BILIRUB SERPL-MCNC: 0.4 MG/DL (ref 0.2–1.2)
BUN SERPL-MCNC: 13 MG/DL (ref 7–25)
BUN/CREATININE RATIO,BUCR: ABNORMAL (CALC) (ref 6–22)
CALCIUM SERPL-MCNC: 9.7 MG/DL (ref 8.6–10.4)
CHLORIDE SERPL-SCNC: 104 MMOL/L (ref 98–110)
CHOL/HDL RATIO,CHHDX: 4.4 (CALC)
CHOLEST SERPL-MCNC: 191 MG/DL
CO2 SERPL-SCNC: 30 MMOL/L (ref 20–32)
CREAT SERPL-MCNC: 0.77 MG/DL (ref 0.5–1.03)
EGFR: 92 ML/MIN/1.73M2
GLOBULIN,GLOB: 3.3 G/DL (CALC) (ref 1.9–3.7)
GLUCOSE SERPL-MCNC: 109 MG/DL (ref 65–99)
HDLC SERPL-MCNC: 43 MG/DL
LDL-CHOLESTEROL: 128 MG/DL (CALC)
NON-HDL CHOLESTEROL, 011976: 148 MG/DL (CALC)
POTASSIUM SERPL-SCNC: 4.5 MMOL/L (ref 3.5–5.3)
PROT SERPL-MCNC: 7.2 G/DL (ref 6.1–8.1)
SODIUM SERPL-SCNC: 140 MMOL/L (ref 135–146)
TRIGL SERPL-MCNC: 101 MG/DL (ref ?–150)

## 2023-12-11 ENCOUNTER — TELEPHONE (OUTPATIENT)
Age: 53
End: 2023-12-11

## 2023-12-11 RX ORDER — ROSUVASTATIN CALCIUM 20 MG/1
20 TABLET, COATED ORAL DAILY
Qty: 90 TABLET | Refills: 3 | Status: SHIPPED | OUTPATIENT
Start: 2023-12-11

## 2023-12-11 NOTE — TELEPHONE ENCOUNTER
Lipid and Cmp results in   LDL      128   HDL      43   Creat    0.77  Full lab report given to Dr. Jeferson Mares to review.

## 2024-06-24 ENCOUNTER — OFFICE VISIT (OUTPATIENT)
Age: 54
End: 2024-06-24
Payer: COMMERCIAL

## 2024-06-24 VITALS
DIASTOLIC BLOOD PRESSURE: 80 MMHG | HEART RATE: 67 BPM | BODY MASS INDEX: 43.29 KG/M2 | OXYGEN SATURATION: 94 % | HEIGHT: 66 IN | SYSTOLIC BLOOD PRESSURE: 108 MMHG

## 2024-06-24 DIAGNOSIS — R00.2 HEART PALPITATIONS: ICD-10-CM

## 2024-06-24 DIAGNOSIS — R06.09 DOE (DYSPNEA ON EXERTION): Primary | ICD-10-CM

## 2024-06-24 DIAGNOSIS — Z98.61 HISTORY OF PTCA: ICD-10-CM

## 2024-06-24 DIAGNOSIS — I10 ESSENTIAL HYPERTENSION: ICD-10-CM

## 2024-06-24 DIAGNOSIS — I25.10 ATHEROSCLEROSIS OF NATIVE CORONARY ARTERY OF NATIVE HEART WITHOUT ANGINA PECTORIS: ICD-10-CM

## 2024-06-24 DIAGNOSIS — Z82.49 FAMILY HISTORY OF ISCHEMIC HEART DISEASE: ICD-10-CM

## 2024-06-24 PROCEDURE — 93000 ELECTROCARDIOGRAM COMPLETE: CPT | Performed by: INTERNAL MEDICINE

## 2024-06-24 PROCEDURE — 3079F DIAST BP 80-89 MM HG: CPT | Performed by: INTERNAL MEDICINE

## 2024-06-24 PROCEDURE — 3074F SYST BP LT 130 MM HG: CPT | Performed by: INTERNAL MEDICINE

## 2024-06-24 PROCEDURE — 99214 OFFICE O/P EST MOD 30 MIN: CPT | Performed by: INTERNAL MEDICINE

## 2024-06-24 RX ORDER — FAMOTIDINE 40 MG/1
TABLET, FILM COATED ORAL
COMMUNITY
Start: 2024-04-09

## 2024-06-24 RX ORDER — ROSUVASTATIN CALCIUM 40 MG/1
40 TABLET, COATED ORAL NIGHTLY
Qty: 90 TABLET | Refills: 3 | Status: SHIPPED | OUTPATIENT
Start: 2024-06-24

## 2024-06-24 ASSESSMENT — PATIENT HEALTH QUESTIONNAIRE - PHQ9
SUM OF ALL RESPONSES TO PHQ QUESTIONS 1-9: 0
SUM OF ALL RESPONSES TO PHQ9 QUESTIONS 1 & 2: 0
1. LITTLE INTEREST OR PLEASURE IN DOING THINGS: NOT AT ALL
2. FEELING DOWN, DEPRESSED OR HOPELESS: NOT AT ALL
SUM OF ALL RESPONSES TO PHQ QUESTIONS 1-9: 0

## 2024-06-24 NOTE — PROGRESS NOTES
Relation Age of Onset    Asthma Sister     Other Father 70        AAA    Pancreatic Cancer Mother 64        w liver mets    Rheum Arthritis Mother     Diabetes Mother     Stroke Mother     High Cholesterol Mother     Heart Attack Mother 40    Heart Disease Mother         pacemaker    Hypertension Mother     Anesth Problems Neg Hx     Other Cousin         OTHER    Lupus Other         x 2 MATERNAL COUSINS    Cancer Maternal Aunt     Cancer Maternal Aunt         lung    Breast Cancer Maternal Aunt     Obesity Maternal Grandmother     Heart Attack Maternal Grandmother       Social History     Socioeconomic History    Marital status: Single     Spouse name: Not on file    Number of children: Not on file    Years of education: Not on file    Highest education level: Not on file   Occupational History    Not on file   Tobacco Use    Smoking status: Never    Smokeless tobacco: Never    Tobacco comments:     Quit smoking: lived with smoker mom x 17 yrs   Vaping Use    Vaping Use: Never used   Substance and Sexual Activity    Alcohol use: Yes     Alcohol/week: 1.0 standard drink of alcohol     Comment: socially    Drug use: No    Sexual activity: Not on file   Other Topics Concern    Not on file   Social History Narrative    Not on file     Social Determinants of Health     Financial Resource Strain: Not on file   Food Insecurity: Not on file   Transportation Needs: Not on file   Physical Activity: Not on file   Stress: Not on file   Social Connections: Not on file   Intimate Partner Violence: Not on file   Housing Stability: Not on file      Current Outpatient Medications   Medication Sig    famotidine (PEPCID) 40 MG tablet 1 TABLET DAILY BEFORE BREAKFAST    rosuvastatin (CRESTOR) 20 MG tablet Take 1 tablet by mouth daily    metoprolol tartrate (LOPRESSOR) 25 MG tablet Take 0.5 tablets by mouth 2 times daily    aspirin 81 MG EC tablet Take 1 tablet by mouth daily    nitroGLYCERIN (NITROSTAT) 0.4 MG SL tablet Place 1 tablet

## 2024-08-01 ENCOUNTER — TELEPHONE (OUTPATIENT)
Age: 54
End: 2024-08-01

## 2024-12-02 RX ORDER — METOPROLOL TARTRATE 25 MG/1
TABLET, FILM COATED ORAL
Qty: 90 TABLET | Refills: 1 | Status: SHIPPED | OUTPATIENT
Start: 2024-12-02

## 2025-03-31 NOTE — TELEPHONE ENCOUNTER
It appears it was cancelled. [facility].    Refill Request Received for the Following Medication     Requested Prescriptions     Pending Prescriptions Disp Refills    metoprolol tartrate (LOPRESSOR) 25 MG tablet [Pharmacy Med Name: METOPROLOL TARTRATE 25 MG TAB] 90 tablet 3     Sig: TAKE 1/2 TABLET TWICE A DAY BY MOUTH       Last Prescribed:06-    Last Appointment With Me:  6/24/2024     Future Appointments:  Future Appointments   Date Time Provider Department Center   6/24/2025  9:20 AM Trevor Camacho MD [facility]

## 2025-05-23 RX ORDER — METOPROLOL TARTRATE 25 MG/1
TABLET, FILM COATED ORAL
Qty: 90 TABLET | Refills: 0 | Status: SHIPPED | OUTPATIENT
Start: 2025-05-23

## 2025-05-23 NOTE — TELEPHONE ENCOUNTER
Refill Request Received for the Following Medication     Requested Prescriptions     Pending Prescriptions Disp Refills    metoprolol tartrate (LOPRESSOR) 25 MG tablet [Pharmacy Med Name: METOPROLOL TARTRATE 25 MG TAB] 90 tablet 1     Sig: TAKE 1/2 TABLET TWICE A DAY BY MOUTH     Last Prescribed: 12-    Last Appointment With Me:  6/24/2024     Future Appointments:  Future Appointments   Date Time Provider Department Center   6/24/2025  9:20 AM Trevor Camacho MD [facility]